# Patient Record
Sex: MALE | Race: BLACK OR AFRICAN AMERICAN | Employment: FULL TIME | URBAN - METROPOLITAN AREA
[De-identification: names, ages, dates, MRNs, and addresses within clinical notes are randomized per-mention and may not be internally consistent; named-entity substitution may affect disease eponyms.]

---

## 2020-08-28 ENCOUNTER — TELEPHONE (OUTPATIENT)
Dept: INTERNAL MEDICINE CLINIC | Age: 30
End: 2020-08-28

## 2020-08-28 NOTE — TELEPHONE ENCOUNTER
----- Message from Medina Eloisa sent at 8/28/2020  2:10 PM EDT -----  Subject: Message to Provider    QUESTIONS  Information for Provider? Patient wants to get shots on his Sept 14th   appointment- flu shot   HPV vaccine (3rd in series)   Hepatitis B vaccine (3rd in series). Patient is also interested in   appointment sooner if there's a cancellation (same day is even ok). Thank   you so much!   ---------------------------------------------------------------------------  --------------  CALL BACK INFO  What is the best way for the office to contact you? OK to leave message on   voicemail  Preferred Call Back Phone Number? [phone]  ---------------------------------------------------------------------------  --------------  SCRIPT ANSWERS  Relationship to Patient?  Self

## 2020-09-14 ENCOUNTER — OFFICE VISIT (OUTPATIENT)
Dept: INTERNAL MEDICINE CLINIC | Age: 30
End: 2020-09-14
Payer: COMMERCIAL

## 2020-09-14 VITALS
HEART RATE: 80 BPM | SYSTOLIC BLOOD PRESSURE: 122 MMHG | BODY MASS INDEX: 21.8 KG/M2 | DIASTOLIC BLOOD PRESSURE: 70 MMHG | TEMPERATURE: 98.1 F | RESPIRATION RATE: 18 BRPM | WEIGHT: 147.6 LBS | OXYGEN SATURATION: 97 %

## 2020-09-14 PROBLEM — Z00.00 ANNUAL PHYSICAL EXAM: Status: ACTIVE | Noted: 2020-09-14

## 2020-09-14 PROBLEM — R05.3 CHRONIC COUGH: Status: ACTIVE | Noted: 2020-09-14

## 2020-09-14 LAB
BILIRUBIN, POC: NORMAL
BLOOD URINE, POC: NORMAL
CLARITY, POC: CLEAR
COLOR, POC: YELLOW
GLUCOSE URINE, POC: NORMAL
KETONES, POC: NORMAL
LEUKOCYTE EST, POC: NORMAL
NITRITE, POC: NORMAL
PH, POC: 5
PROTEIN, POC: NORMAL
SPECIFIC GRAVITY, POC: 1.03
UROBILINOGEN, POC: 0.2

## 2020-09-14 PROCEDURE — 99395 PREV VISIT EST AGE 18-39: CPT | Performed by: INTERNAL MEDICINE

## 2020-09-14 PROCEDURE — 90651 9VHPV VACCINE 2/3 DOSE IM: CPT | Performed by: INTERNAL MEDICINE

## 2020-09-14 PROCEDURE — 90472 IMMUNIZATION ADMIN EACH ADD: CPT | Performed by: INTERNAL MEDICINE

## 2020-09-14 PROCEDURE — 81002 URINALYSIS NONAUTO W/O SCOPE: CPT | Performed by: INTERNAL MEDICINE

## 2020-09-14 PROCEDURE — 90471 IMMUNIZATION ADMIN: CPT | Performed by: INTERNAL MEDICINE

## 2020-09-14 PROCEDURE — 90746 HEPB VACCINE 3 DOSE ADULT IM: CPT | Performed by: INTERNAL MEDICINE

## 2020-09-14 SDOH — ECONOMIC STABILITY: INCOME INSECURITY: HOW HARD IS IT FOR YOU TO PAY FOR THE VERY BASICS LIKE FOOD, HOUSING, MEDICAL CARE, AND HEATING?: NOT HARD AT ALL

## 2020-09-14 SDOH — ECONOMIC STABILITY: FOOD INSECURITY: WITHIN THE PAST 12 MONTHS, THE FOOD YOU BOUGHT JUST DIDN'T LAST AND YOU DIDN'T HAVE MONEY TO GET MORE.: NEVER TRUE

## 2020-09-14 SDOH — ECONOMIC STABILITY: TRANSPORTATION INSECURITY
IN THE PAST 12 MONTHS, HAS THE LACK OF TRANSPORTATION KEPT YOU FROM MEDICAL APPOINTMENTS OR FROM GETTING MEDICATIONS?: NO

## 2020-09-14 SDOH — ECONOMIC STABILITY: TRANSPORTATION INSECURITY
IN THE PAST 12 MONTHS, HAS LACK OF TRANSPORTATION KEPT YOU FROM MEETINGS, WORK, OR FROM GETTING THINGS NEEDED FOR DAILY LIVING?: NO

## 2020-09-14 SDOH — ECONOMIC STABILITY: FOOD INSECURITY: WITHIN THE PAST 12 MONTHS, YOU WORRIED THAT YOUR FOOD WOULD RUN OUT BEFORE YOU GOT MONEY TO BUY MORE.: NEVER TRUE

## 2020-09-14 ASSESSMENT — ENCOUNTER SYMPTOMS
GASTROINTESTINAL NEGATIVE: 1
CHEST TIGHTNESS: 0
SINUS PAIN: 0
WHEEZING: 0
RHINORRHEA: 0
TROUBLE SWALLOWING: 0
COUGH: 1
SINUS PRESSURE: 0
SHORTNESS OF BREATH: 0

## 2020-09-14 ASSESSMENT — PATIENT HEALTH QUESTIONNAIRE - PHQ9
2. FEELING DOWN, DEPRESSED OR HOPELESS: 0
1. LITTLE INTEREST OR PLEASURE IN DOING THINGS: 0
SUM OF ALL RESPONSES TO PHQ QUESTIONS 1-9: 0
SUM OF ALL RESPONSES TO PHQ QUESTIONS 1-9: 0
SUM OF ALL RESPONSES TO PHQ9 QUESTIONS 1 & 2: 0

## 2020-09-14 NOTE — PATIENT INSTRUCTIONS
Discussed issues with him  Will arrange for lung function tests to evaluate his cough   Needs 3 rd HEP-B vaccine  Will give HPV vaccine   Will need fasting labs

## 2020-09-14 NOTE — PROGRESS NOTES
Subjective:      Patient ID: David Gillespie is a 31 y.o. male. annual physical     HPI  He want to have HPV vaccine and 3 rd dose of hep-B  Ha sno health issues or symptoms  Has no symptoms with activity  Has no cp gi or gu symptoms-some times feels winded and some times has light cough-when he does strenuous activity  Has no h/o asthma and has no pets in house. Has sneezes some times and not regularly. Not active physically and walks 30 minutes in pm   Fh ph and personal history reviewed. He is single and not active sexually. Review of Systems   Constitutional: Negative for activity change, appetite change, chills, fatigue, fever and unexpected weight change. HENT: Negative for ear pain, postnasal drip, rhinorrhea, sinus pressure, sinus pain and trouble swallowing. Eyes: Negative for visual disturbance. Respiratory: Positive for cough. Negative for chest tightness, shortness of breath and wheezing. Cardiovascular: Negative for chest pain, palpitations and leg swelling. Gastrointestinal: Negative. Endocrine: Negative. Genitourinary: Negative. Musculoskeletal: Negative. Neurological: Negative for dizziness, light-headedness and headaches. Psychiatric/Behavioral: Negative for dysphoric mood and sleep disturbance. The patient is not nervous/anxious. Objective:   Physical Exam  Vitals signs and nursing note reviewed. Constitutional:       General: He is not in acute distress. Appearance: Normal appearance. HENT:      Head: Normocephalic. Right Ear: Tympanic membrane, ear canal and external ear normal. There is no impacted cerumen. Left Ear: Tympanic membrane, ear canal and external ear normal. There is no impacted cerumen. Nose: Nose normal. No congestion. Mouth/Throat:      Mouth: Mucous membranes are moist.      Pharynx: Oropharynx is clear. No oropharyngeal exudate or posterior oropharyngeal erythema. Eyes:      General: No scleral icterus. Extraocular Movements: Extraocular movements intact. Conjunctiva/sclera: Conjunctivae normal.      Pupils: Pupils are equal, round, and reactive to light. Neck:      Thyroid: No thyromegaly. Vascular: No carotid bruit or JVD. Cardiovascular:      Rate and Rhythm: Normal rate and regular rhythm. Pulses: Normal pulses. Heart sounds: Normal heart sounds. No murmur. No gallop. Pulmonary:      Effort: No respiratory distress. Breath sounds: Normal breath sounds. No wheezing (has forcd exp cough and wheez heard), rhonchi or rales. Abdominal:      General: Bowel sounds are normal. There is no distension. Palpations: Abdomen is soft. There is no hepatomegaly, splenomegaly or mass. Tenderness: There is no abdominal tenderness. Musculoskeletal:      Right lower leg: No edema. Left lower leg: No edema. Lymphadenopathy:      Cervical: No cervical adenopathy. Neurological:      General: No focal deficit present. Mental Status: He is alert.    Psychiatric:         Mood and Affect: Mood normal.         Assessment:    Normal exam  Possibly  has mild asthma        Plan:      Discussed issues with him  Will arrange for lung function tests to evaluate his cough   Needs 3 rd HEP-B vaccine  Will give HPV vaccine   Will need fasting labs        Violet Vazquez MD

## 2020-09-21 ENCOUNTER — OFFICE VISIT (OUTPATIENT)
Dept: PRIMARY CARE CLINIC | Age: 30
End: 2020-09-21
Payer: COMMERCIAL

## 2020-09-21 PROCEDURE — 99211 OFF/OP EST MAY X REQ PHY/QHP: CPT | Performed by: NURSE PRACTITIONER

## 2020-09-22 LAB — SARS-COV-2, NAA: NOT DETECTED

## 2020-09-25 ENCOUNTER — HOSPITAL ENCOUNTER (OUTPATIENT)
Dept: PULMONOLOGY | Age: 30
Discharge: HOME | End: 2020-09-25
Payer: COMMERCIAL

## 2020-09-25 PROCEDURE — 94726 PLETHYSMOGRAPHY LUNG VOLUMES: CPT

## 2020-09-25 PROCEDURE — 94664 DEMO&/EVAL PT USE INHALER: CPT

## 2020-09-25 PROCEDURE — 6360000002 HC RX W HCPCS: Performed by: INTERNAL MEDICINE

## 2020-09-25 PROCEDURE — 94060 EVALUATION OF WHEEZING: CPT

## 2020-09-25 PROCEDURE — 94760 N-INVAS EAR/PLS OXIMETRY 1: CPT

## 2020-09-25 PROCEDURE — 94729 DIFFUSING CAPACITY: CPT

## 2020-09-25 RX ORDER — ALBUTEROL SULFATE 2.5 MG/3ML
2.5 SOLUTION RESPIRATORY (INHALATION) ONCE
Status: COMPLETED | OUTPATIENT
Start: 2020-09-25 | End: 2020-09-25

## 2020-09-25 RX ADMIN — ALBUTEROL SULFATE 2.5 MG: 2.5 SOLUTION RESPIRATORY (INHALATION) at 17:19

## 2020-09-28 DIAGNOSIS — Z00.00 ANNUAL PHYSICAL EXAM: ICD-10-CM

## 2020-09-28 LAB
A/G RATIO: 1.9 (ref 1.1–2.2)
ALBUMIN SERPL-MCNC: 4.6 G/DL (ref 3.4–5)
ALP BLD-CCNC: 80 U/L (ref 40–129)
ALT SERPL-CCNC: 20 U/L (ref 10–40)
ANION GAP SERPL CALCULATED.3IONS-SCNC: 10 MMOL/L (ref 3–16)
AST SERPL-CCNC: 19 U/L (ref 15–37)
BASOPHILS ABSOLUTE: 0 K/UL (ref 0–0.2)
BASOPHILS RELATIVE PERCENT: 0.4 %
BILIRUB SERPL-MCNC: 0.5 MG/DL (ref 0–1)
BUN BLDV-MCNC: 14 MG/DL (ref 7–20)
CALCIUM SERPL-MCNC: 9.9 MG/DL (ref 8.3–10.6)
CHLORIDE BLD-SCNC: 103 MMOL/L (ref 99–110)
CHOLESTEROL, TOTAL: 149 MG/DL (ref 0–199)
CO2: 27 MMOL/L (ref 21–32)
CREAT SERPL-MCNC: 0.9 MG/DL (ref 0.9–1.3)
EOSINOPHILS ABSOLUTE: 0.2 K/UL (ref 0–0.6)
EOSINOPHILS RELATIVE PERCENT: 4.5 %
GFR AFRICAN AMERICAN: >60
GFR NON-AFRICAN AMERICAN: >60
GLOBULIN: 2.4 G/DL
GLUCOSE BLD-MCNC: 88 MG/DL (ref 70–99)
HCT VFR BLD CALC: 47 % (ref 40.5–52.5)
HDLC SERPL-MCNC: 54 MG/DL (ref 40–60)
HEMOGLOBIN: 15.7 G/DL (ref 13.5–17.5)
LDL CHOLESTEROL CALCULATED: 83 MG/DL
LYMPHOCYTES ABSOLUTE: 2.1 K/UL (ref 1–5.1)
LYMPHOCYTES RELATIVE PERCENT: 40.9 %
MCH RBC QN AUTO: 29.8 PG (ref 26–34)
MCHC RBC AUTO-ENTMCNC: 33.5 G/DL (ref 31–36)
MCV RBC AUTO: 88.8 FL (ref 80–100)
MONOCYTES ABSOLUTE: 0.4 K/UL (ref 0–1.3)
MONOCYTES RELATIVE PERCENT: 7.2 %
NEUTROPHILS ABSOLUTE: 2.4 K/UL (ref 1.7–7.7)
NEUTROPHILS RELATIVE PERCENT: 47 %
PDW BLD-RTO: 12.7 % (ref 12.4–15.4)
PLATELET # BLD: 243 K/UL (ref 135–450)
PMV BLD AUTO: 8.4 FL (ref 5–10.5)
POTASSIUM SERPL-SCNC: 4.9 MMOL/L (ref 3.5–5.1)
RBC # BLD: 5.29 M/UL (ref 4.2–5.9)
SODIUM BLD-SCNC: 140 MMOL/L (ref 136–145)
TOTAL PROTEIN: 7 G/DL (ref 6.4–8.2)
TRIGL SERPL-MCNC: 60 MG/DL (ref 0–150)
TSH SERPL DL<=0.05 MIU/L-ACNC: 2.54 UIU/ML (ref 0.27–4.2)
VLDLC SERPL CALC-MCNC: 12 MG/DL
WBC # BLD: 5 K/UL (ref 4–11)

## 2020-10-01 LAB — IGE: 128 KU/L

## 2020-10-06 RX ORDER — FLUTICASONE PROPIONATE 110 UG/1
2 AEROSOL, METERED RESPIRATORY (INHALATION) 2 TIMES DAILY
Qty: 1 INHALER | Refills: 1 | Status: SHIPPED | OUTPATIENT
Start: 2020-10-06 | End: 2021-06-18

## 2020-10-06 NOTE — PROCEDURES
Pulmonary Function Test:     Indication: Annual physical exam    Never smoked     Test comment:     Spirometry data is acceptable and reproducible. Maximum effort given during the test.    Pulse ox is 99% on room air    Estimated body mass index is 21.8 kg/m² as calculated from the following:    Height as of 12/27/14: 5' 9\" (1.753 m). Weight as of 9/14/20: 147 lb 9.6 oz (67 kg). Spirometry data:    FEV1/FVC: 84. Predicted ratio 83    Pre-Bronchodilator FEV1 4.02L, which is 105% predicted    Post-Bronchodilator FEV1 4.3L, which is 113% predicted    There is 7% reversibility     FVC is 4.81L, which is 105% predicted    Lung Volumes:    TLC (by Plethysmography) is 6.16L, which is 89% predicted    RV is 2. 11L which is 128% predicted    Diffusion Capacity:    DLCO is 32.31 which is 79% predicted    Impression:  1. There is no obstruction present  2. There is no significant reversibility with bronchodilator      [Increase in FEV1 => 12% of control and => 200 ml]  However there is improvement in flow across small airways with bronchodilator   3. There is no significant hyperinflation. There is air trapping of unclear significance   4. There is no reduction in diffusion capacity    Comment:   Normal spirometry and diffusion capacity   Presence of air trapping is of unclear significance. Clinical correlation is recommended.

## 2020-10-09 ENCOUNTER — OFFICE VISIT (OUTPATIENT)
Dept: INTERNAL MEDICINE CLINIC | Age: 30
End: 2020-10-09
Payer: COMMERCIAL

## 2020-10-09 VITALS
TEMPERATURE: 98.2 F | OXYGEN SATURATION: 99 % | SYSTOLIC BLOOD PRESSURE: 112 MMHG | RESPIRATION RATE: 16 BRPM | HEIGHT: 69 IN | WEIGHT: 152.2 LBS | BODY MASS INDEX: 22.54 KG/M2 | HEART RATE: 80 BPM | DIASTOLIC BLOOD PRESSURE: 75 MMHG

## 2020-10-09 PROCEDURE — 90471 IMMUNIZATION ADMIN: CPT | Performed by: INTERNAL MEDICINE

## 2020-10-09 PROCEDURE — 99213 OFFICE O/P EST LOW 20 MIN: CPT | Performed by: INTERNAL MEDICINE

## 2020-10-09 PROCEDURE — 90686 IIV4 VACC NO PRSV 0.5 ML IM: CPT | Performed by: INTERNAL MEDICINE

## 2020-10-09 ASSESSMENT — ENCOUNTER SYMPTOMS
SORE THROAT: 0
TROUBLE SWALLOWING: 0
RHINORRHEA: 0

## 2020-10-09 NOTE — PROGRESS NOTES
Vaccine Information Sheet, \"Influenza - Inactivated\"  given to [employer], or parent/legal guardian of  [employer] and verbalized understanding. Patient responses:    Have you ever had a reaction to a flu vaccine? No  Do you have any current illness? No  Have you ever had Guillian [place] Syndrome? No  Do you have a serious allergy to any of the follow: Neomycin, Polymyxin, Thimerosal, eggs or egg products? No    Flu vaccine given per order. Please see immunization tab. Risks and benefits explained. Current VIS given.       Immunizations Administered     Name Date Dose Route    Influenza, Quadv, IM, PF (6 mo and older Fluzone, Flulaval, Fluarix, and 3 yrs and older Afluria) 10/9/2020 0.5 mL Intramuscular    Site: Deltoid- Left    Lot: [id number]    NDC: 70812-019-55

## 2020-10-09 NOTE — PROGRESS NOTES
Subjective:      Patient ID: Dakotah Segura is a 31 y.o. male. HPI  Still has cough on and off   And when he coughs he feels some movement in chest and is not sure what it is. Has some  wheezing after cough  And has no dyspnea with exertion or activities   Has not sure he has symptoms after taking or laughing. He ha sno heart burns  Review of Systems   Constitutional: Negative for fever. HENT: Negative for postnasal drip, rhinorrhea, sore throat and trouble swallowing. Neurological: Negative for dizziness, light-headedness and headaches. Objective:   Physical Exam  Vitals signs and nursing note reviewed. Constitutional:       General: He is not in acute distress. Eyes:      General: No scleral icterus. Extraocular Movements: Extraocular movements intact. Conjunctiva/sclera: Conjunctivae normal.      Pupils: Pupils are equal, round, and reactive to light. Neck:      Thyroid: No thyromegaly. Vascular: No carotid bruit. Cardiovascular:      Rate and Rhythm: Normal rate and regular rhythm. Pulses: Normal pulses. Heart sounds: Normal heart sounds. No murmur. No gallop. Pulmonary:      Effort: No respiratory distress. Breath sounds: Normal breath sounds. No wheezing (has cough after deep breathing), rhonchi or rales. Lymphadenopathy:      Cervical: No cervical adenopathy. Neurological:      Mental Status: He is alert. PFt indicating improvements in FEV1 after bronchodilators.   Assessment:      Cough possibly due to bronchospasm -by symptoms and PFT reports-may have [facility]:      [address] [place] [address] [address] START USING INHALER AND [address] IN 3 Adrian Fuentes MD

## 2020-10-09 NOTE — PATIENT INSTRUCTIONS
DISCUSSED WITH HIM IN DETAIL AND START USING INHALER AND SE E IN 3 WEEKS  Patient Education        Using a Metered-Dose Inhaler: Care Instructions  Your Care Instructions     A metered-dose inhaler lets you breathe medicine into your lungs quickly. Inhaled medicine works faster than the same medicine in a pill. An inhaler allows you to take less medicine than you would need if you took it as a pill. \"Metered-dose\" means that the inhaler gives a measured amount of medicine each time you use it. A metered-dose inhaler gives medicine in the form of a liquid mist.  Your doctor may want you to use a spacer with your inhaler. A spacer is a chamber that you attach to the inhaler. The chamber holds the medicine before you inhale it. That way, you can inhale the medicine in as many breaths as you need. Doctors recommend using a spacer with most metered-dose inhalers, especially those with corticosteroid medicines. Follow-up care is a key part of your treatment and safety. Be sure to make and go to all appointments, and call your doctor if you are having problems. It's also a good idea to know your test results and keep a list of the medicines you take. How can you care for yourself at home? To get started  · Talk with your health care provider to be sure you are using your inhaler the right way. It might help if you practice using it in front of a mirror. Use the inhaler exactly as prescribed. · Check that you have the correct medicine. If you use more than one inhaler, put a label on each one. This will let you know which one to use at the right time. · Keep track of how much medicine is in the inhaler. Check the label to see how many doses are in the container. If you know how many puffs you can take, you can replace the inhaler before you run out. Ask your health care provider how you can keep track of how much medicine is left. · Talk to your health care provider about using a spacer with your inhaler.  Spacers make it easier to get the medicine into your lungs. You may need a spacer if you are using corticosteroid medicines. A spacer can also help if you have problems pressing the inhaler and breathing in at the same time. · If you are using a corticosteroid inhaler, gargle and rinse out your mouth with water after use. Do not swallow the water. Swallowing the water will increase the chance that the medicine will get into your bloodstream. This may make it more likely that you will have side effects. To use a spacer with an inhaler  1. Shake the inhaler. Remove the inhaler cap, and place the mouthpiece of the inhaler into the spacer. Check the inhaler instructions to see if you need to prime your inhaler before you use it. If it needs priming, follow the instructions on how to prime your inhaler. 2. Remove the cap from the spacer. 3. Hold the inhaler upright with the mouthpiece at the bottom. 4. Tilt your head back a little, and breathe out slowly and completely. 5. Place the spacer's mouthpiece in your mouth. 6. Press down on the inhaler to spray one puff of medicine into the spacer, and then start breathing in slowly. Wait to inhale until after you have pressed down on the inhaler. Some spacers have a whistle. If you hear it, you should breathe in more slowly. 7. Hold your breath for 10 seconds. This will let the medicine settle in your lungs. 8. If you need to take a second dose, wait 30 to 60 seconds to allow the inhaler valve to refill. To use an inhaler without a spacer  1. Shake the inhaler as directed. Remove the cap. Check the instructions to see if you need to prime your inhaler before you use it. If it needs priming, follow the instructions on how to prime your inhaler. 2. Hold the inhaler upright with the mouthpiece at the bottom. 3. Tilt your head back a little, and breathe out slowly and completely. 4. Position the inhaler in one of two ways:  ? You can place the inhaler in your mouth.  This is easier for most people. And it lowers the risk that any of the medicine will get into your eyes. ? Or you can place the inhaler 1 to 2 inches in front of your open mouth, without closing your lips over it. Try to open your mouth as wide as you can. Placing the inhaler in front of your open mouth may be better for getting the medicine into your lungs. But some people may find this too hard to do. 5. Start taking slow, even breaths through your mouth. Press down on the inhaler once, then inhale fully. 6. Hold your breath for 10 seconds. This will let the medicine settle in your lungs. 7. If you need to take a second dose, wait 30 to 60 seconds to allow the inhaler valve to refill. Where can you learn more? Go to https://relocality.[employer]. org and sign in to your [employer] account. Enter K111 in the [employer] box to learn more about \"Using a Metered-Dose Inhaler: Care Instructions. \"     If you do not have an account, please click on the \"Sign Up Now\" link. Current as of: February 24, 2020               Content Version: 12.6  © 8618-5023 [employer]. Care instructions adapted under license by [facility] ([facility]). If you have questions about a medical condition or this instruction, always ask your healthcare professional. [address] any warranty or liability for your use of this information.

## 2020-10-14 PROBLEM — Z00.00 ANNUAL PHYSICAL EXAM: Status: RESOLVED | Noted: 2020-09-14 | Resolved: 2020-10-14

## 2020-11-02 ENCOUNTER — OFFICE VISIT (OUTPATIENT)
Dept: INTERNAL MEDICINE CLINIC | Age: 30
End: 2020-11-02
Payer: COMMERCIAL

## 2020-11-02 VITALS
HEART RATE: 96 BPM | OXYGEN SATURATION: 99 % | SYSTOLIC BLOOD PRESSURE: 122 MMHG | RESPIRATION RATE: 18 BRPM | WEIGHT: 150.6 LBS | BODY MASS INDEX: 22.24 KG/M2 | TEMPERATURE: 98.5 F | DIASTOLIC BLOOD PRESSURE: 80 MMHG

## 2020-11-02 PROCEDURE — 99213 OFFICE O/P EST LOW 20 MIN: CPT | Performed by: INTERNAL MEDICINE

## 2020-11-02 ASSESSMENT — ENCOUNTER SYMPTOMS
TROUBLE SWALLOWING: 0
WHEEZING: 0
SHORTNESS OF BREATH: 0
CHEST TIGHTNESS: 0
COUGH: 0

## 2020-11-02 NOTE — PROGRESS NOTES
Subjective:      Patient ID: Corinne Valentino is a 31 y.o. male. HPI  Cough is lot better and much better  Now cough is occasional and sleeping well now  Physically  has no symptoms with activity  Has no other cp gi or gu symptoms  Review of Systems   Constitutional: Negative for activity change, appetite change, chills, fever and unexpected weight change. HENT: Negative for trouble swallowing. Respiratory: Negative for cough (may be occasionally.), chest tightness, shortness of breath and wheezing. Cardiovascular: Negative for chest pain, palpitations and leg swelling. Neurological: Negative for dizziness, light-headedness and headaches. Objective:   Physical Exam  Vitals signs and nursing note reviewed. Constitutional:       General: He is not in acute distress. Eyes:      Extraocular Movements: Extraocular movements intact. Conjunctiva/sclera: Conjunctivae normal.      Pupils: Pupils are equal, round, and reactive to light. Neck:      Vascular: No carotid bruit. Cardiovascular:      Rate and Rhythm: Normal rate and regular rhythm. Pulses: Normal pulses. Heart sounds: Normal heart sounds. No murmur. No gallop. Pulmonary:      Effort: No respiratory distress. Breath sounds: Normal breath sounds. No wheezing, rhonchi or rales. Musculoskeletal:      Right lower leg: No edema. Left lower leg: No edema. Lymphadenopathy:      Cervical: No cervical adenopathy. Neurological:      Mental Status: He is alert. Assessment:      Bronchospasm  symptoms much improved but forgetting pm dose x 1 week    symptoms started this summer   Plan:      Advised to use Flovent inhaler 2 puffs 2 times daily. see in  2 months.         Arias Harris MD

## 2021-01-04 ENCOUNTER — OFFICE VISIT (OUTPATIENT)
Dept: INTERNAL MEDICINE CLINIC | Age: 31
End: 2021-01-04
Payer: COMMERCIAL

## 2021-01-04 VITALS
TEMPERATURE: 97.9 F | HEIGHT: 69 IN | RESPIRATION RATE: 12 BRPM | WEIGHT: 150 LBS | SYSTOLIC BLOOD PRESSURE: 124 MMHG | OXYGEN SATURATION: 98 % | BODY MASS INDEX: 22.22 KG/M2 | DIASTOLIC BLOOD PRESSURE: 84 MMHG | HEART RATE: 84 BPM

## 2021-01-04 DIAGNOSIS — J45.30 MILD PERSISTENT REACTIVE AIRWAY DISEASE WITHOUT COMPLICATION: ICD-10-CM

## 2021-01-04 DIAGNOSIS — R05.3 CHRONIC COUGH: Primary | ICD-10-CM

## 2021-01-04 PROBLEM — J45.909 REACTIVE AIRWAY DISEASE WITHOUT COMPLICATION: Status: ACTIVE | Noted: 2021-01-04

## 2021-01-04 PROCEDURE — 99213 OFFICE O/P EST LOW 20 MIN: CPT | Performed by: INTERNAL MEDICINE

## 2021-01-04 ASSESSMENT — ENCOUNTER SYMPTOMS
CHEST TIGHTNESS: 0
GASTROINTESTINAL NEGATIVE: 1
WHEEZING: 0
SHORTNESS OF BREATH: 1

## 2021-01-04 ASSESSMENT — PATIENT HEALTH QUESTIONNAIRE - PHQ9
2. FEELING DOWN, DEPRESSED OR HOPELESS: 0
SUM OF ALL RESPONSES TO PHQ QUESTIONS 1-9: 0
SUM OF ALL RESPONSES TO PHQ9 QUESTIONS 1 & 2: 0
1. LITTLE INTEREST OR PLEASURE IN DOING THINGS: 0

## 2021-03-11 PROBLEM — M41.9 SCOLIOSIS OF THORACIC SPINE: Status: ACTIVE | Noted: 2018-05-15

## 2021-03-11 PROBLEM — K12.1 ORAL ULCER: Status: ACTIVE | Noted: 2018-05-15

## 2021-03-12 ENCOUNTER — OFFICE VISIT (OUTPATIENT)
Dept: PULMONOLOGY | Age: 31
End: 2021-03-12
Payer: COMMERCIAL

## 2021-03-12 VITALS
HEART RATE: 78 BPM | DIASTOLIC BLOOD PRESSURE: 64 MMHG | BODY MASS INDEX: 22.22 KG/M2 | WEIGHT: 150 LBS | SYSTOLIC BLOOD PRESSURE: 118 MMHG | OXYGEN SATURATION: 99 % | HEIGHT: 69 IN

## 2021-03-12 DIAGNOSIS — J45.30 MILD PERSISTENT REACTIVE AIRWAY DISEASE WITHOUT COMPLICATION: ICD-10-CM

## 2021-03-12 DIAGNOSIS — R05.3 CHRONIC COUGH: Primary | ICD-10-CM

## 2021-03-12 PROCEDURE — 94664 DEMO&/EVAL PT USE INHALER: CPT | Performed by: INTERNAL MEDICINE

## 2021-03-12 PROCEDURE — 99204 OFFICE O/P NEW MOD 45 MIN: CPT | Performed by: INTERNAL MEDICINE

## 2021-03-12 NOTE — PROGRESS NOTES
PULMONARY OFFICE NEW PATIENT VISIT    CONSULTING PHYSICIAN:  Montse Walls MD ,     REASON FOR VISIT:   Chief Complaint   Patient presents with    New Patient    Shortness of Breath       DATE OF VISIT: 3/12/2021    HISTORY OF PRESENT ILLNESS: 31y.o. year old male into the pulmonary office for the first time. Patient reports that in the summer 2020 he was exposed to some dust and smoke which led to shortness of breath, wheezing and chest tightness. After a few days symptoms subsided but then he started to have episodic cough and shortness of breath. He was prescribed with Flovent HFA by his primary care physician which has provided him with partial relief. He still continues to have exertional shortness of breath associated with wheezing. Cough is very intermittent and dry. No expectoration. Denies any nasal symptoms. No family history of lung diseases. Patient did not have any breathing issues in his childhood. REVIEW OF SYSTEMS:   CONSTITUTIONAL SYMPTOMS: The patient denies fever, fatigue, night sweats, weight loss or weight gain. HEENT: No vision changes. No tinnitus, Denies sinus pain. No hoarseness, or dysphagia. NECK: Patient denies swelling in the neck. CARDIOVASCULAR: Denies chest pain, palpitation, syncope. RESPIRATORY: As per HPI. GASTROINTESTINAL: Denies nausea, abdominal pain or change in bowel function. GENITOURINARY: Denies obstructive symptoms. No history of incontinence. BREASTS: No masses or lumps in the breasts. SKIN: No rashes or itching. MUSCULOSKELETAL: Denies weakness or bone pain. NEUROLOGICAL: No headaches or seizures. PSYCHIATRIC: Denies mood swings or depression. ENDOCRINE: Denies heat or cold intolerance or excessive thirst.  HEMATOLOGIC/LYMPHATIC: Denies easy bruising or lymph node swelling. ALLERGIC/IMMUNOLOGIC: No environmental allergies. PAST MEDICAL HISTORY: History reviewed. No pertinent past medical history.     PAST SURGICAL patient has a normal sensorium globally. LABS:    Lab Summary Latest Ref Rng & Units 9/28/2020   WBC 4.0 - 11.0 K/uL 5.0   Hgb 13.5 - 17.5 g/dL 15.7   Hct 40.5 - 52.5 % 47.0   Platelets 388 - 241 K/uL 243   Sodium 136 - 145 mmol/L 140   Potassium 3.5 - 5.1 mmol/L 4.9   BUN 7 - 20 mg/dL 14   Creatinine 0.9 - 1.3 mg/dL 0.9   Glucose 70 - 99 mg/dL 88   Calcium 8.3 - 10.6 mg/dL 9.9   Alk Phos 40 - 129 U/L 80   Albumin 3.4 - 5.0 g/dL 4.6   Bilirubin 0.0 - 1.0 mg/dL 0.5   AST 15 - 37 U/L 19   ALT 10 - 40 U/L 20   HDL cholesterol 40 - 60 mg/dL 54   Triglycerides 0 - 150 mg/dL 60   LDL calc <100 mg/dL 83   VLDL calc Not Established mg/dL 12   TSH 0.27 - 4.20 uIU/mL 2.54         IMAGING:    No new chest imaging for me to review. Pulmonary Functions Testing Results:    Complete PFT done on 9/24/2020  FEV1 prebronchodilator 4.02/105 post 4.30/13.  7% change  FVC prebronchodilator 4.81/105 post 4.92/8.  2% change  FEV1/FVC prebronchodilator 84 post 87  Total lung capacity: 89%  Residual volume: 128%  Diffusion capacity: 79%    This complete PFT was personally viewed by me and my interpretation is: Spirometry is within normal limits. Evidence of air trapping. Near normal diffusion capacity. IMPRESSION AND RECOMMENDATIONS:     1. Chronic cough  -Patient has intermittent cough which is dry and nonproductive.  -Strong suspicion that patient may have mild intermittent asthma. 2. Mild persistent reactive airway disease without complication  -I will change his inhaler therapy to Advair HFA in order to provide him with both inhaled corticosteroids and long-acting beta-2 agonist.  -Patient was instructed in the use of MDI Demonstration Device. Patient understood instructions and is aware to call the office if they have any problems or questions.   Time spent was 5 mins.  -Based on his response I will tailor the therapy further.  -Advised patient to avoid any potential triggers that he can identify.  -Additionally I will order for a total IgE level and respiratory allergen panel. Return in about 3 months (around 6/12/2021). Isatu Jameson MD  Pulmonary Critical Care and Sleep Medicine  3/12/2021, 4:31 PM    This note was completed using dragon medical speech recognition software. Grammatical errors, random word insertions, pronoun errors and incomplete sentences are occasional consequences of this technology due to software limitations. If there are questions or concerns about the content of this note of information contained within the body of this dictation they should be addressed with the provider for clarification.

## 2021-05-04 ENCOUNTER — TELEPHONE (OUTPATIENT)
Dept: EMERGENCY DEPT | Age: 31
End: 2021-05-04

## 2021-05-04 NOTE — TELEPHONE ENCOUNTER
[employer] member made an outreach attempt in regards to scheduling a COVID vaccine appointment, there was no answer but writer left a message.

## 2021-06-18 ENCOUNTER — OFFICE VISIT (OUTPATIENT)
Dept: PULMONOLOGY | Age: 31
End: 2021-06-18
Payer: COMMERCIAL

## 2021-06-18 VITALS
HEIGHT: 69 IN | HEART RATE: 91 BPM | OXYGEN SATURATION: 99 % | SYSTOLIC BLOOD PRESSURE: 112 MMHG | DIASTOLIC BLOOD PRESSURE: 70 MMHG | BODY MASS INDEX: 22.22 KG/M2 | WEIGHT: 150 LBS

## 2021-06-18 DIAGNOSIS — R05.3 CHRONIC COUGH: Primary | ICD-10-CM

## 2021-06-18 DIAGNOSIS — J45.30 MILD PERSISTENT REACTIVE AIRWAY DISEASE WITHOUT COMPLICATION: ICD-10-CM

## 2021-06-18 PROCEDURE — 94664 DEMO&/EVAL PT USE INHALER: CPT | Performed by: INTERNAL MEDICINE

## 2021-06-18 PROCEDURE — 99214 OFFICE O/P EST MOD 30 MIN: CPT | Performed by: INTERNAL MEDICINE

## 2021-06-18 RX ORDER — ALBUTEROL SULFATE 90 UG/1
2 AEROSOL, METERED RESPIRATORY (INHALATION) EVERY 6 HOURS PRN
Qty: 1 INHALER | Refills: 5 | Status: SHIPPED | OUTPATIENT
Start: 2021-06-18 | End: 2021-12-21 | Stop reason: SDUPTHER

## 2021-06-18 NOTE — PROGRESS NOTES
easy bruising or lymph node swelling. ALLERGIC/IMMUNOLOGIC: No environmental allergies. PAST MEDICAL HISTORY: History reviewed. No pertinent past medical history. PAST SURGICAL HISTORY: History reviewed. No pertinent surgical history. SOCIAL HISTORY:   Social History     Tobacco Use    Smoking status: Never Smoker    Smokeless tobacco: Never Used   Vaping Use    Vaping Use: Never used   Substance Use Topics    Alcohol use: No    Drug use: Not on file       FAMILY HISTORY:   Family History   Problem Relation Age of Onset    Diabetes Father        MEDICATIONS:     No current outpatient medications on file prior to visit. No current facility-administered medications on file prior to visit. ALLERGIES:   Allergies as of 06/18/2021    (No Known Allergies)      OBJECTIVE:   height is 5' 9\" (1.753 m) and weight is 150 lb (68 kg). His blood pressure is 112/70 and his pulse is 91. His oxygen saturation is 99%. PHYSICAL EXAM:    CONSTITUTIONAL: He is a 27y.o.-year-old who appears his stated age. He is alert and oriented x 3 and in no acute distress. HEENT: PERRLA, EOMI. No scleral icterus. No thrush, atraumatic, normocephalic. NECK: Supple, without cervical or supraclavicular lymphadenopathy:  CARDIOVASCULAR: S1 S2 RRR. Without murmer  RESPIRATORY & CHEST: Lungs are clear to auscultation and percussion. No wheezing, no crackles. Good air movement  GASTROINTESTINAL & ABDOMEN: Soft, nontender, positive bowel sounds in all quadrants, non-distended, without hepatosplenomegaly. GENITOURINARY: Deferred. MUSCULOSKELETAL: No tenderness to palpation of the axial skeleton. There is no clubbing. No cyanosis. No edema of the lower extremities. SKIN OF BODY: No rash or jaundice. PSYCHIATRIC EVALUATION: Normal affect. Patient answers questions appropriately. HEMATOLOGIC/LYMPHATIC/ IMMUNOLOGIC: No palpable lymphadenopathy. NEUROLOGIC: Alert and oriented x 3. Groslly non-focal. Motor strength is 5+/5 in all muscle groups. The patient has a normal sensorium globally. LABS:    Lab Summary Latest Ref Rng & Units 9/28/2020   WBC 4.0 - 11.0 K/uL 5.0   Hgb 13.5 - 17.5 g/dL 15.7   Hct 40.5 - 52.5 % 47.0   Platelets 805 - 574 K/uL 243   Sodium 136 - 145 mmol/L 140   Potassium 3.5 - 5.1 mmol/L 4.9   BUN 7 - 20 mg/dL 14   Creatinine 0.9 - 1.3 mg/dL 0.9   Glucose 70 - 99 mg/dL 88   Calcium 8.3 - 10.6 mg/dL 9.9   Alk Phos 40 - 129 U/L 80   Albumin 3.4 - 5.0 g/dL 4.6   Bilirubin 0.0 - 1.0 mg/dL 0.5   AST 15 - 37 U/L 19   ALT 10 - 40 U/L 20   HDL cholesterol 40 - 60 mg/dL 54   Triglycerides 0 - 150 mg/dL 60   LDL calc <100 mg/dL 83   VLDL calc Not Established mg/dL 12   TSH 0.27 - 4.20 uIU/mL 2.54         IMAGING:    No new chest imaging for me to review. Pulmonary Functions Testing Results:    Complete PFT done on 9/24/2020  FEV1 prebronchodilator 4.02/105 post 4.30/13.  7% change  FVC prebronchodilator 4.81/105 post 4.92/8.  2% change  FEV1/FVC prebronchodilator 84 post 87  Total lung capacity: 89%  Residual volume: 128%  Diffusion capacity: 79%    This complete PFT was personally viewed by me and my interpretation is: Spirometry is within normal limits. Evidence of air trapping. Near normal diffusion capacity. IMPRESSION AND RECOMMENDATIONS:     1. Chronic cough  -Patient has intermittent cough which is dry and nonproductive.  -Strong suspicion that patient may have mild intermittent asthma. 2. Mild persistent reactive airway disease without complication  -I will continue him with Advair HFA as before.    -In addition I will add albuterol HFA provided with as needed short acting bronchodilators.    -Patient was instructed in the use of MDI Demonstration Device. Patient understood instructions and is aware to call the office if they have any problems or questions. Time spent was 5 mins.   -Advised patient to avoid any potential triggers that he can identify.  -Additionally I will order for a total IgE level and respiratory allergen panel. Return in about 6 months (around 12/18/2021). Kori Jackson MD  Pulmonary Critical Care and Sleep Medicine  6/18/2021, 11:05 AM    This note was completed using dragon medical speech recognition software. Grammatical errors, random word insertions, pronoun errors and incomplete sentences are occasional consequences of this technology due to software limitations. If there are questions or concerns about the content of this note of information contained within the body of this dictation they should be addressed with the provider for clarification.

## 2021-12-15 ENCOUNTER — TELEPHONE (OUTPATIENT)
Dept: INTERNAL MEDICINE CLINIC | Age: 31
End: 2021-12-15
Payer: COMMERCIAL

## 2021-12-15 DIAGNOSIS — Z00.00 ANNUAL PHYSICAL EXAM: Primary | ICD-10-CM

## 2021-12-15 NOTE — TELEPHONE ENCOUNTER
----- Message from Na Stone sent at 12/15/2021  3:10 PM EST -----  Subject: Referral Request    QUESTIONS   Reason for referral request? pt has appt on 12/22 and he would like to get   his lab done prior to appt so he can discuss the results during his appt   time. please call pt once orders are placed so he can get his labs done. call pt before placing labs so he knows what he is getting done. Has the physician seen you for this condition before? No   Preferred Specialist (if applicable)? Do you already have an appointment scheduled? No  Additional Information for Provider?   ---------------------------------------------------------------------------  --------------  CALL BACK INFO  What is the best way for the office to contact you? OK to leave message on   voicemail  Preferred Call Back Phone Number?  [phone]

## 2021-12-16 NOTE — TELEPHONE ENCOUNTER
You can,but they will not be part of annual exam  Can order them  for  dx of h/o exposure to STD,if he has h/o this  and let patient know of this before ordering and if he understands this,can order tests  If there is no h/o std exposure ,current recommendations are for only hep-c and HIV as part of annual exam

## 2021-12-16 NOTE — TELEPHONE ENCOUNTER
I informed patient that we could do labs, I will place orders, however while speaking to patient , he would like to have STI/ STD testing HIV, Hep C, Hep B and chlamydia/ gonorrhea- Okay to place these orders?

## 2021-12-17 ENCOUNTER — NURSE ONLY (OUTPATIENT)
Dept: INTERNAL MEDICINE CLINIC | Age: 31
End: 2021-12-17
Payer: COMMERCIAL

## 2021-12-17 DIAGNOSIS — Z00.00 ANNUAL PHYSICAL EXAM: Primary | ICD-10-CM

## 2021-12-17 DIAGNOSIS — Z20.2 POTENTIAL EXPOSURE TO STD: ICD-10-CM

## 2021-12-17 LAB
A/G RATIO: 1.8 (ref 1.1–2.2)
ALBUMIN SERPL-MCNC: 4.8 G/DL (ref 3.4–5)
ALP BLD-CCNC: 79 U/L (ref 40–129)
ALT SERPL-CCNC: 28 U/L (ref 10–40)
ANION GAP SERPL CALCULATED.3IONS-SCNC: 13 MMOL/L (ref 3–16)
AST SERPL-CCNC: 37 U/L (ref 15–37)
BASOPHILS ABSOLUTE: 0 K/UL (ref 0–0.2)
BASOPHILS RELATIVE PERCENT: 0.2 %
BILIRUB SERPL-MCNC: 0.3 MG/DL (ref 0–1)
BILIRUBIN URINE: NEGATIVE
BLOOD, URINE: NEGATIVE
BUN BLDV-MCNC: 16 MG/DL (ref 7–20)
CALCIUM SERPL-MCNC: 9.2 MG/DL (ref 8.3–10.6)
CHLORIDE BLD-SCNC: 105 MMOL/L (ref 99–110)
CHOLESTEROL, TOTAL: 138 MG/DL (ref 0–199)
CLARITY: CLEAR
CO2: 23 MMOL/L (ref 21–32)
COLOR: YELLOW
CREAT SERPL-MCNC: 0.8 MG/DL (ref 0.9–1.3)
EOSINOPHILS ABSOLUTE: 0.1 K/UL (ref 0–0.6)
EOSINOPHILS RELATIVE PERCENT: 0.7 %
GFR AFRICAN AMERICAN: >60
GFR NON-AFRICAN AMERICAN: >60
GLUCOSE BLD-MCNC: 98 MG/DL (ref 70–99)
GLUCOSE URINE: NEGATIVE MG/DL
HBV SURFACE AB TITR SER: >1000 MIU/ML
HCT VFR BLD CALC: 47.2 % (ref 40.5–52.5)
HDLC SERPL-MCNC: 51 MG/DL (ref 40–60)
HEMOGLOBIN: 15.5 G/DL (ref 13.5–17.5)
HEPATITIS C ANTIBODY INTERPRETATION: NORMAL
KETONES, URINE: NEGATIVE MG/DL
LDL CHOLESTEROL CALCULATED: 77 MG/DL
LEUKOCYTE ESTERASE, URINE: NEGATIVE
LYMPHOCYTES ABSOLUTE: 1.3 K/UL (ref 1–5.1)
LYMPHOCYTES RELATIVE PERCENT: 16.9 %
MCH RBC QN AUTO: 29.5 PG (ref 26–34)
MCHC RBC AUTO-ENTMCNC: 32.9 G/DL (ref 31–36)
MCV RBC AUTO: 89.8 FL (ref 80–100)
MICROSCOPIC EXAMINATION: NORMAL
MONOCYTES ABSOLUTE: 0.4 K/UL (ref 0–1.3)
MONOCYTES RELATIVE PERCENT: 5 %
NEUTROPHILS ABSOLUTE: 5.9 K/UL (ref 1.7–7.7)
NEUTROPHILS RELATIVE PERCENT: 77.2 %
NITRITE, URINE: NEGATIVE
PDW BLD-RTO: 12.4 % (ref 12.4–15.4)
PH UA: 5.5 (ref 5–8)
PLATELET # BLD: 222 K/UL (ref 135–450)
PMV BLD AUTO: 9.3 FL (ref 5–10.5)
POTASSIUM SERPL-SCNC: 5.6 MMOL/L (ref 3.5–5.1)
PROTEIN UA: NEGATIVE MG/DL
RBC # BLD: 5.26 M/UL (ref 4.2–5.9)
SODIUM BLD-SCNC: 141 MMOL/L (ref 136–145)
SPECIFIC GRAVITY UA: 1.03 (ref 1–1.03)
TOTAL PROTEIN: 7.4 G/DL (ref 6.4–8.2)
TRIGL SERPL-MCNC: 50 MG/DL (ref 0–150)
URINE TYPE: NORMAL
UROBILINOGEN, URINE: 0.2 E.U./DL
VLDLC SERPL CALC-MCNC: 10 MG/DL
WBC # BLD: 7.6 K/UL (ref 4–11)

## 2021-12-17 PROCEDURE — 81003 URINALYSIS AUTO W/O SCOPE: CPT | Performed by: INTERNAL MEDICINE

## 2021-12-17 PROCEDURE — 36415 COLL VENOUS BLD VENIPUNCTURE: CPT | Performed by: INTERNAL MEDICINE

## 2021-12-17 NOTE — PROGRESS NOTES
All labs were drawn for annual physical: CBC, CMP, LIPIDS, UA. Per patient's request , he wanted to have STD testing done. I explained to patient that this is not going to be covered under his annual wellness exam, that it will he will  be responsible for the bill, unless his insurance pays a portion of it. He states that he understands this.

## 2021-12-18 LAB
HIV AG/AB: NORMAL
HIV ANTIGEN: NORMAL
HIV-1 ANTIBODY: NORMAL
HIV-2 AB: NORMAL
TOTAL SYPHILLIS IGG/IGM: NORMAL

## 2021-12-19 LAB
C. TRACHOMATIS DNA ,URINE: NEGATIVE
N. GONORRHOEAE DNA, URINE: NEGATIVE

## 2021-12-21 DIAGNOSIS — J45.30 MILD PERSISTENT REACTIVE AIRWAY DISEASE WITHOUT COMPLICATION: Primary | ICD-10-CM

## 2021-12-21 RX ORDER — ALBUTEROL SULFATE 90 UG/1
2 AEROSOL, METERED RESPIRATORY (INHALATION) EVERY 6 HOURS PRN
Qty: 1 EACH | Refills: 1 | Status: SHIPPED | OUTPATIENT
Start: 2021-12-21 | End: 2022-02-15 | Stop reason: SDUPTHER

## 2021-12-22 ENCOUNTER — OFFICE VISIT (OUTPATIENT)
Dept: INTERNAL MEDICINE CLINIC | Age: 31
End: 2021-12-22
Payer: COMMERCIAL

## 2021-12-22 VITALS
RESPIRATION RATE: 16 BRPM | DIASTOLIC BLOOD PRESSURE: 80 MMHG | OXYGEN SATURATION: 97 % | TEMPERATURE: 98.1 F | WEIGHT: 153.4 LBS | BODY MASS INDEX: 22.72 KG/M2 | HEART RATE: 85 BPM | SYSTOLIC BLOOD PRESSURE: 120 MMHG | HEIGHT: 69 IN

## 2021-12-22 DIAGNOSIS — Z00.00 ANNUAL PHYSICAL EXAM: ICD-10-CM

## 2021-12-22 LAB
HERPES TYPE 1/2 IGM COMBINED: 0.53 IV
HERPES TYPE I/II IGG COMBINED: 0.52 IV

## 2021-12-22 PROCEDURE — 99395 PREV VISIT EST AGE 18-39: CPT | Performed by: INTERNAL MEDICINE

## 2021-12-22 SDOH — ECONOMIC STABILITY: FOOD INSECURITY: WITHIN THE PAST 12 MONTHS, YOU WORRIED THAT YOUR FOOD WOULD RUN OUT BEFORE YOU GOT MONEY TO BUY MORE.: NEVER TRUE

## 2021-12-22 SDOH — ECONOMIC STABILITY: FOOD INSECURITY: WITHIN THE PAST 12 MONTHS, THE FOOD YOU BOUGHT JUST DIDN'T LAST AND YOU DIDN'T HAVE MONEY TO GET MORE.: NEVER TRUE

## 2021-12-22 ASSESSMENT — ENCOUNTER SYMPTOMS
TROUBLE SWALLOWING: 0
GASTROINTESTINAL NEGATIVE: 1
ALLERGIC/IMMUNOLOGIC NEGATIVE: 1
COUGH: 0
CHEST TIGHTNESS: 0
SHORTNESS OF BREATH: 0

## 2021-12-22 ASSESSMENT — SOCIAL DETERMINANTS OF HEALTH (SDOH): HOW HARD IS IT FOR YOU TO PAY FOR THE VERY BASICS LIKE FOOD, HOUSING, MEDICAL CARE, AND HEATING?: NOT HARD AT ALL

## 2021-12-22 NOTE — PROGRESS NOTES
2021    [facility] (:  1990) is a 27 y.o. male, here for a preventive medicine evaluation. Had an abscess removed from scalp  Has slight madison e in skin  in penile area and over around lips and  has no bleeding  Has no cough or wheezing  And has no heart burns   He wheezes when he tried to breath out hard but not otherwise  Had PFT done and are normal and seen Pulmonary MD and follows up with him  Has no symptoms with activity  Has no cp gi or gu symptoms  Feels he has spots over lipis and not bother him and also seen an skin discoloration over his glans and has no itching  Patient Active Problem List   Diagnosis    Cellulitis of face-left    Vertigo    Chronic cough    Reactive airway disease without complication    Idiopathic urticaria    Oral ulcer    Pimples    Scoliosis of thoracic spine       Review of Systems   Constitutional: Negative for activity change, appetite change and unexpected weight change. HENT: Negative for ear pain, hearing loss, nosebleeds, postnasal drip and trouble swallowing. Eyes: Negative for visual disturbance. Respiratory: Negative for cough, chest tightness and shortness of breath. Cardiovascular: Negative for chest pain, palpitations and leg swelling. Gastrointestinal: Negative. Genitourinary: Positive for frequency (during day and not at nights and has no other symptosm but drinks lot fo fluids but not coffeee). Negative for difficulty urinating, dysuria, hematuria, penile discharge, penile pain and urgency. Allergic/Immunologic: Negative. Neurological: Negative for dizziness, light-headedness and headaches. Prior to Visit Medications    Medication Sig Taking?  Authorizing Provider   albuterol sulfate  (90 Base) MCG/ACT inhaler Inhale 2 puffs into the lungs every 6 hours as needed for Wheezing  Patient not taking: Reported on 2021  Anthony Meredith MD   fluticasone-salmeterol (ADVAIR HFA) 853-97 MCG/ACT inhaler Inhale 2 puffs into the lungs 2 times daily  Patient not taking: Reported on 12/22/2021  Anthony Meredith MD        No Known Allergies    History reviewed. No pertinent past medical history. History reviewed. No pertinent surgical history. Social History     Socioeconomic History    Marital status: Single     Spouse name: Not on file    Number of children: Not on file    Years of education: Not on file    Highest education level: Not on file   Occupational History    Not on file   Tobacco Use    Smoking status: Never Smoker    Smokeless tobacco: Never Used   Vaping Use    Vaping Use: Never used   Substance and Sexual Activity    Alcohol use: No    Drug use: Not on file    Sexual activity: Not on file   Other Topics Concern    Not on file   Social History Narrative    Not on file     Social Determinants of Health     Financial Resource Strain: Low Risk     Difficulty of Paying Living Expenses: Not hard at all   Food Insecurity: No Food Insecurity    Worried About [address] [place] [address] in the Last Year: Never true    Joselo of Food in the Last Year: Never true   Transportation Needs: No Transportation Needs    Lack of Transportation (Medical): No    Lack of Transportation (Non-Medical):  No   Physical Activity:     Days of Exercise per Week: Not on file    Minutes of Exercise per Session: Not on file   Stress:     Feeling of Stress : Not on file   Social Connections:     Frequency of Communication with Friends and Family: Not on file    Frequency of Social Gatherings with Friends and Family: Not on file    Attends Yarsani Services: Not on file    Active Member of Clubs or Organizations: Not on file    Attends Club or Organization Meetings: Not on file    Marital Status: Not on file   Intimate Partner Violence:     Fear of Current or Ex-Partner: Not on file    Emotionally Abused: Not on file    Physically Abused: Not on file    Sexually Abused: Not on file   Housing Stability:     Unable to Pay for Housing in the Last Year: Not on file    Number of Places Lived in the Last Year: Not on file    Unstable Housing in the Last Year: Not on file        Family History   Problem Relation Age of Onset    Diabetes Father        ADVANCE DIRECTIVE: N, <no information>    Vitals:    12/22/21 1608   BP: 126/78   Site: Left Upper Arm   Position: Sitting   Cuff Size: Medium Adult   Pulse: 85   Resp: 16   Temp: 98.1 °F (36.7 °C)   SpO2: 97%   Weight: 153 lb 6.4 oz (69.6 kg)   Height: 5' 9\" (1.753 m)     Estimated body mass index is 22.65 kg/m² as calculated from the following:    Height as of this encounter: 5' 9\" (1.753 m). Weight as of this encounter: 153 lb 6.4 oz (69.6 kg). Physical Exam  Vitals and nursing note reviewed. Constitutional:       General: He is not in acute distress. HENT:      Head: Normocephalic. Right Ear: Tympanic membrane, ear canal and external ear normal. There is no impacted cerumen. Left Ear: Tympanic membrane, ear canal and external ear normal. There is no impacted cerumen. Nose: Nose normal. No congestion. Mouth/Throat:      Mouth: Mucous membranes are moist.      Pharynx: Oropharynx is clear. No oropharyngeal exudate or posterior oropharyngeal erythema. Eyes:      General: No scleral icterus. Extraocular Movements: Extraocular movements intact. Conjunctiva/sclera: Conjunctivae normal.      Pupils: Pupils are equal, round, and reactive to light. Neck:      Thyroid: No thyromegaly. Vascular: No carotid bruit or JVD. Cardiovascular:      Rate and Rhythm: Normal rate and regular rhythm. Pulses: Normal pulses. Heart sounds: Normal heart sounds. No murmur heard. No gallop. Pulmonary:      Effort: No respiratory distress. Breath sounds: Normal breath sounds. No wheezing, rhonchi or rales. Abdominal:      General: Bowel sounds are normal. There is no distension. Palpations: Abdomen is soft.  There is no hepatomegaly, splenomegaly or mass. Tenderness: There is no abdominal tenderness. Musculoskeletal:      Right lower leg: No edema. Left lower leg: No edema. Lymphadenopathy:      Cervical: No cervical adenopathy. Skin:     Comments: Has dry patch of skin over his glans penis and ha snos welling or drainage  aas pin head brown spots seen over right upper lip and hardly visible and not bale to feel them   Neurological:      Mental Status: He is alert and oriented to person, place, and time. No flowsheet data found. Lab Results   Component Value Date    CHOL 138 12/17/2021    CHOL 149 09/28/2020    TRIG 50 12/17/2021    TRIG 60 09/28/2020    HDL 51 12/17/2021    HDL 54 09/28/2020    LDLCALC 77 12/17/2021    LDLCALC 83 09/28/2020    GLUCOSE 98 12/17/2021       The ASCVD Risk score (Rinku Su, et al., 2013) failed to calculate for the following reasons:     The 2013 ASCVD risk score is only valid for ages 36 to 78    Immunization History   Administered Date(s) Administered    COVID-19, J&J, PF, 0.5 mL 04/07/2021    COVID-19, Moderna, Booster, PF, 50mcg/0.25ml 11/27/2021    DTaP vaccine 1990, 1989, 1989, 1989, 1989, 1989, 05/07/1991, 05/07/1991, 05/09/1994, 05/09/1994    HPV (Human Papilloma Virus)Vaccine 03/16/2018, 05/18/2018    HPV 9-valent Maurilio Miguel) 09/14/2020    Hepatitis B Adult (Engerix-B) 09/14/2020    Hepatitis B Ped/Adol (Engerix-B, Recombivax HB) 10/12/2001, 10/12/2001, 01/31/2002, 01/31/2002, 05/21/2002, 05/21/2002    Hepatitis B vaccine 05/18/2018, 06/22/2018    Hib vaccine 09/25/1990, 09/25/1990    Influenza Vaccine, unspecified formulation 11/16/2012    Influenza Virus Vaccine 11/16/2012, 02/01/2018    Influenza, MDCK Quadv, IM, PF (Flucelvax 2 yrs and older) 01/24/2020    Influenza, Caro Jackman, IM, PF (6 mo and older Fluzone, Flulaval, Fluarix, and 3 yrs and older Afluria) 10/09/2020    MMR 09/25/1990, 09/25/1990, 10/12/2001, 10/12/2001    Polio Virus Vaccine 1989, 1989, 1989, 1989, 05/07/1991, 05/07/1991, 05/09/1994, 05/09/1994    Tdap (Boostrix, Adacel) 06/14/2006, 06/14/2006, 03/16/2018       Health Maintenance   Topic Date Due    Pneumococcal 0-64 years Vaccine (1 of 2 - PPSV23) Never done    Flu vaccine (1) 09/01/2021    DTaP/Tdap/Td vaccine (8 - Td or Tdap) 03/16/2028    Hepatitis B vaccine  Completed    Hib vaccine  Completed    COVID-19 Vaccine  Completed    Hepatitis C screen  Completed    HIV screen  Completed    Hepatitis A vaccine  Aged Out    Meningococcal (ACWY) vaccine  Aged Out    Varicella vaccine  Discontinued          ASSESSMENT/PLAN:  Normal exam  chromic cough unchanged    skin areas he is feeling over upper lip and penile area -no evidence of ecze and more from dry skin     Plan  Discussed with him  If he feels skin spots are getting worse,need to see Dermatologist  Use moisturizing creams to his penile delphine 2-3 times a day  All his labs are normal.  Advised regular activity  An electronic signature was used to authenticate this note.     --Raji Gutierrez MD on 12/22/2021 at 4:17 PM

## 2021-12-22 NOTE — PATIENT INSTRUCTIONS
Discussed with him  If he feels skin spots are getting worse,need to see Dermatologist  Use moisturizing creams to his penile delphine 2-3 times a day  All his labs are normal.  Advised regular activity

## 2021-12-29 ENCOUNTER — TELEPHONE (OUTPATIENT)
Dept: INTERNAL MEDICINE CLINIC | Age: 31
End: 2021-12-29

## 2021-12-29 NOTE — TELEPHONE ENCOUNTER
Would like a referral to a Dermatoligist. Would like to be check out. abscess on his scalp .     Please call patient back at [phone]

## 2021-12-29 NOTE — TELEPHONE ENCOUNTER
[facility] be seen [facility] at [phone]  Can be seen by sil[facility]re other Dermatology office -if we know any one where he can get appt soon   If abscess is acute,[facility]

## 2022-01-21 PROBLEM — Z00.00 ANNUAL PHYSICAL EXAM: Status: RESOLVED | Noted: 2021-12-22 | Resolved: 2022-01-21

## 2022-02-12 DIAGNOSIS — J45.30 MILD PERSISTENT REACTIVE AIRWAY DISEASE WITHOUT COMPLICATION: ICD-10-CM

## 2022-02-12 DIAGNOSIS — R05.3 CHRONIC COUGH: ICD-10-CM

## 2022-02-15 ENCOUNTER — OFFICE VISIT (OUTPATIENT)
Dept: PULMONOLOGY | Age: 32
End: 2022-02-15
Payer: COMMERCIAL

## 2022-02-15 VITALS
HEIGHT: 69 IN | HEART RATE: 88 BPM | DIASTOLIC BLOOD PRESSURE: 72 MMHG | BODY MASS INDEX: 21.92 KG/M2 | WEIGHT: 148 LBS | SYSTOLIC BLOOD PRESSURE: 118 MMHG | OXYGEN SATURATION: 100 %

## 2022-02-15 DIAGNOSIS — J45.30 MILD PERSISTENT REACTIVE AIRWAY DISEASE WITHOUT COMPLICATION: ICD-10-CM

## 2022-02-15 PROCEDURE — 99214 OFFICE O/P EST MOD 30 MIN: CPT | Performed by: INTERNAL MEDICINE

## 2022-02-15 RX ORDER — ALBUTEROL SULFATE 90 UG/1
2 AEROSOL, METERED RESPIRATORY (INHALATION) EVERY 6 HOURS PRN
Qty: 1 EACH | Refills: 5 | Status: SHIPPED | OUTPATIENT
Start: 2022-02-15 | End: 2022-09-27 | Stop reason: SDUPTHER

## 2022-02-15 NOTE — PROGRESS NOTES
PULMONARY OFFICE FOLLOW-UP VISIT    CONSULTING PHYSICIAN:  Bambi Jimenez MD ,     REASON FOR VISIT:   Chief Complaint   Patient presents with    Cough     has improved    Wheezing     has improved        DATE OF VISIT: 2/15/2022    HISTORY OF PRESENT ILLNESS: 27y.o. year old male into the pulmonary office for a follow-up. Patient reports that his breathing has been stable. He is currently using Advair HFA. There are several days when he forgets to take the inhaler. Has not required to use albuterol. Denies any nasal symptoms. Previously: Patient reports that his breathing is slowly stabilizing. He still has some episodes where he feels short of breath. Cough is improved drastically. He is using Advair HFA inhaler regularly. Patient reports that in the summer 2020 he was exposed to some dust and smoke which led to shortness of breath, wheezing and chest tightness. After a few days symptoms subsided but then he started to have episodic cough and shortness of breath. He was prescribed with Flovent HFA by his primary care physician which has provided him with partial relief. He still continues to have exertional shortness of breath associated with wheezing. Cough is very intermittent and dry. No expectoration. Denies any nasal symptoms. No family history of lung diseases. Patient did not have any breathing issues in his childhood. REVIEW OF SYSTEMS:   8 point review of system is negative except that mentioned in the HPI. PAST MEDICAL HISTORY: No past medical history on file. PAST SURGICAL HISTORY: No past surgical history on file.     SOCIAL HISTORY:   Social History     Tobacco Use    Smoking status: Never Smoker    Smokeless tobacco: Never Used   Vaping Use    Vaping Use: Never used   Substance Use Topics    Alcohol use: No    Drug use: Not on file       FAMILY HISTORY:   Family History   Problem Relation Age of Onset    Diabetes Father        MEDICATIONS:     No current outpatient medications on file prior to visit. No current facility-administered medications on file prior to visit. ALLERGIES:   Allergies as of 02/15/2022    (No Known Allergies)      OBJECTIVE:   height is 5' 9\" (1.753 m) and weight is 148 lb (67.1 kg). His blood pressure is 118/72 and his pulse is 88. His oxygen saturation is 100%. PHYSICAL EXAM:    CONSTITUTIONAL: He is a 27y.o.-year-old who appears his stated age. He is alert and oriented x 3 and in no acute distress. HEENT: PERRLA, EOMI. No scleral icterus. No thrush, atraumatic, normocephalic. NECK: Supple, without cervical or supraclavicular lymphadenopathy:  CARDIOVASCULAR: S1 S2 RRR. Without murmer  RESPIRATORY & CHEST: Lungs are clear to auscultation and percussion. No wheezing, no crackles. Good air movement  GASTROINTESTINAL & ABDOMEN: Soft, nontender, positive bowel sounds in all quadrants, non-distended, without hepatosplenomegaly. GENITOURINARY: Deferred. MUSCULOSKELETAL: No tenderness to palpation of the axial skeleton. There is no clubbing. No cyanosis. No edema of the lower extremities. SKIN OF BODY: No rash or jaundice. PSYCHIATRIC EVALUATION: Normal affect. Patient answers questions appropriately. HEMATOLOGIC/LYMPHATIC/ IMMUNOLOGIC: No palpable lymphadenopathy. NEUROLOGIC: Alert and oriented x 3. Groslly non-focal. Motor strength is 5+/5 in all muscle groups. The patient has a normal sensorium globally.       LABS:    Lab Summary Latest Ref Rng & Units 12/17/2021 9/28/2020   WBC 4.0 - 11.0 K/uL 7.6 5.0   Hgb 13.5 - 17.5 g/dL 15.5 15.7   Hct 40.5 - 52.5 % 47.2 47.0   Platelets 771 - 289 K/uL 222 243   Sodium 136 - 145 mmol/L 141 140   Potassium 3.5 - 5.1 mmol/L 5.6(H) 4.9   BUN 7 - 20 mg/dL 16 14   Creatinine 0.9 - 1.3 mg/dL 0.8(L) 0.9   Glucose 70 - 99 mg/dL 98 88   Calcium 8.3 - 10.6 mg/dL 9.2 9.9   Alk Phos 40 - 129 U/L 79 80   Albumin 3.4 - 5.0 g/dL 4.8 4.6   Bilirubin 0.0 - 1.0 mg/dL 0.3 0.5 AST 15 - 37 U/L 37 19   ALT 10 - 40 U/L 28 20   HDL cholesterol 40 - 60 mg/dL 51 54   Triglycerides 0 - 150 mg/dL 50 60   LDL calc <100 mg/dL 77 83   VLDL calc Not Established mg/dL 10 12   TSH 0.27 - 4.20 uIU/mL - 2.54   Some recent data might be hidden         IMAGING:    No new chest imaging for me to review. Pulmonary Functions Testing Results:    Complete PFT done on 9/24/2020  FEV1 prebronchodilator 4.02/105 post 4.30/13.  7% change  FVC prebronchodilator 4.81/105 post 4.92/8.  2% change  FEV1/FVC prebronchodilator 84 post 87  Total lung capacity: 89%  Residual volume: 128%  Diffusion capacity: 79%    This complete PFT was personally viewed by me and my interpretation is: Spirometry is within normal limits. Evidence of air trapping. Near normal diffusion capacity. IMPRESSION AND RECOMMENDATIONS:     1. Chronic cough  -His cough has improved. -Strong suspicion that patient may have mild intermittent asthma. 2. Mild persistent reactive airway disease without complication  -I will continue him with Advair HFA as before.    -In addition I will add albuterol HFA provided with as needed short acting bronchodilators.  -Did advise him to use it more consistently during the winter months.  -Advised patient to avoid any potential triggers that he can identify.  -Patient did get total IgE level and respiratory allergen panel done. Results are pending. Return in about 6 months (around 8/15/2022). Marce Owen MD  Pulmonary Critical Care and Sleep Medicine  2/15/2022, 3:21 PM    This note was completed using dragon medical speech recognition software. Grammatical errors, random word insertions, pronoun errors and incomplete sentences are occasional consequences of this technology due to software limitations.  If there are questions or concerns about the content of this note of information contained within the body of this dictation they should be addressed with the provider for clarification.

## 2022-02-18 LAB
ALLERGEN BERMUDA GRASS IGE: <0.1 KU/L (ref 0–0.34)
ALLERGEN BIRCH IGE: <0.1 KU/L (ref 0–0.34)
ALLERGEN DOG DANDER IGE: <0.1 KU/L (ref 0–0.34)
ALLERGEN GERMAN COCKROACH IGE: 2.38 KU/L (ref 0–0.34)
ALLERGEN HORMODENDRUM IGE: <0.1 KUL/L (ref 0–0.34)
ALLERGEN MOUSE EPITHELIA IGE: <0.1 KU/L (ref 0–0.34)
ALLERGEN OAK TREE IGE: <0.1 KU/L (ref 0–0.34)
ALLERGEN PECAN TREE IGE: 0.7 KU/L (ref 0–0.34)
ALLERGEN PIGWEED ROUGH IGE: <0.1 KU/L (ref 0–0.34)
ALLERGEN SHEEP SORREL (W18) IGE: <0.1 KU/L (ref 0–0.34)
ALLERGEN TREE SYCAMORE: <0.1 KU/L (ref 0–0.34)
ALLERGEN WALNUT TREE IGE: 0.52 KU/L (ref 0–0.34)
ALLERGEN WHITE MULBERRY TREE, IGE: <0.1 KU/L (ref 0–0.34)
ALLERGEN, TREE, WHITE ASH IGE: 0.17 KU/L (ref 0–0.34)
ALTERNARIA ALTERNATA: 0.68 KU/L (ref 0–0.34)
ASPERGILLUS FUMIGATUS: <0.1 KU/L (ref 0–0.34)
CAT DANDER ANTIBODY: <0.1 KU/L (ref 0–0.34)
COTTONWOOD TREE: <0.1 KU/L (ref 0–0.34)
D. FARINAE: 1.33 KU/L (ref 0–0.34)
D. PTERONYSSINUS: 1.21 KU/L (ref 0–0.34)
ELM TREE: 0.23 KU/L (ref 0–0.34)
IGE: 88 IU/ML
IGE: 90 KU/L
MAPLE/BOXELDER TREE: 0.2 KU/L (ref 0–0.34)
MOUNTAIN CEDAR TREE: 0.68 KU/L (ref 0–0.34)
MUCOR RACEMOSUS: <0.1 KU/L (ref 0–0.34)
P. NOTATUM: <0.1 KU/L (ref 0–0.34)
RUSSIAN THISTLE: <0.1 KU/L (ref 0–0.34)
SHORT RAGWD(A ARTEMIS.) IGE: 1.91 KU/L (ref 0–0.34)
TIMOTHY GRASS: 0.29 KU/L (ref 0–0.34)

## 2022-02-23 ENCOUNTER — TELEPHONE (OUTPATIENT)
Dept: PULMONOLOGY | Age: 32
End: 2022-02-23

## 2022-02-23 NOTE — TELEPHONE ENCOUNTER
Patient called saying he was returning Paula's call about blood work. Please call patient at [phone].

## 2022-09-22 ENCOUNTER — TELEPHONE (OUTPATIENT)
Dept: INTERNAL MEDICINE CLINIC | Age: 32
End: 2022-09-22

## 2022-09-22 ENCOUNTER — OFFICE VISIT (OUTPATIENT)
Dept: INTERNAL MEDICINE CLINIC | Age: 32
End: 2022-09-22
Payer: COMMERCIAL

## 2022-09-22 VITALS
OXYGEN SATURATION: 100 % | HEART RATE: 81 BPM | BODY MASS INDEX: 23.19 KG/M2 | WEIGHT: 153 LBS | SYSTOLIC BLOOD PRESSURE: 110 MMHG | DIASTOLIC BLOOD PRESSURE: 77 MMHG | HEIGHT: 68 IN | TEMPERATURE: 97.9 F

## 2022-09-22 DIAGNOSIS — Z91.89 AT RISK FOR SEXUALLY TRANSMITTED DISEASE DUE TO PARTNER WITH SEXUALLY TRANSMITTED DISEASE: ICD-10-CM

## 2022-09-22 DIAGNOSIS — Z76.89 ENCOUNTER TO ESTABLISH CARE WITH NEW DOCTOR: Primary | ICD-10-CM

## 2022-09-22 DIAGNOSIS — J45.30 MILD PERSISTENT REACTIVE AIRWAY DISEASE WITHOUT COMPLICATION: ICD-10-CM

## 2022-09-22 PROCEDURE — 99203 OFFICE O/P NEW LOW 30 MIN: CPT | Performed by: STUDENT IN AN ORGANIZED HEALTH CARE EDUCATION/TRAINING PROGRAM

## 2022-09-22 ASSESSMENT — ENCOUNTER SYMPTOMS
BACK PAIN: 0
COUGH: 0
SORE THROAT: 0
VOMITING: 0
EYE REDNESS: 0
EYE DISCHARGE: 0
RHINORRHEA: 0
NAUSEA: 0
ABDOMINAL PAIN: 0
CHEST TIGHTNESS: 0

## 2022-09-22 ASSESSMENT — PATIENT HEALTH QUESTIONNAIRE - PHQ9
1. LITTLE INTEREST OR PLEASURE IN DOING THINGS: 0
SUM OF ALL RESPONSES TO PHQ QUESTIONS 1-9: 0
2. FEELING DOWN, DEPRESSED OR HOPELESS: 0
SUM OF ALL RESPONSES TO PHQ9 QUESTIONS 1 & 2: 0
SUM OF ALL RESPONSES TO PHQ QUESTIONS 1-9: 0

## 2022-09-22 NOTE — PROGRESS NOTES
Carmina Jolley (:  1990) is a 34 y.o. male,New patient, here for establishment of care and discussion about labs. His former girlfirend tested positive yesterday for HSV 2, he was sexually active with her in November, last year. He has not had a new partner since then and has not been sexually active    Chronic medical condition:  H/o chronic cough  # Mild persistent reactive airway disease without complication  Complete PFT done on 2020  FEV1 prebronchodilator 4.02/105 post 4.30/13.  7% change  FVC prebronchodilator 4.81/105 post 4.92/8.  2% change  FEV1/FVC prebronchodilator 84 post 87  Total lung capacity: 89%  Residual volume: 128%  Diffusion capacity: 79%    Routinely follows up with pulmonologist Dr Florence South MD.  Not using any inhaler right now. 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 He does not smoke, drinks alcohol occasionally twice a year. ASSESSMENT/PLAN:  1. Encounter to establish care with new doctor  Medications history, social history, family history reviewed with the patient. 2. At risk for sexually transmitted disease due to partner with sexually transmitted disease  His ex-girlfriend found to have herpes simplex virus 2 (mildly elevated titer)  The patient is asymptomatic-no rashes, skin lesion, lymphadenopathy, myalgia, joint pain, fever or chills. -     Herpes simplex virus (HSV) I/II antibodies IgG & IgM w/ reflex; Future  -     RPR Reflex; Future  -     C.trachomatis N.gonorrhoeae DNA, Urine; Future  -     HIV Screen; Future    3. Mild persistent reactive airway disease without complication-well-controlled, not required use of inhalers recently. Managed by pulmonologist.    Return in about 6 months (around 3/22/2023) for annual physical examination. Subjective   SUBJECTIVE/OBJECTIVE:  HPI    Review of Systems   Constitutional:  Negative for chills, fatigue and fever.    HENT:  Negative for congestion, ear pain, rhinorrhea and sore throat. Eyes:  Negative for discharge, redness and visual disturbance. Respiratory:  Negative for cough and chest tightness. Cardiovascular:  Negative for chest pain, palpitations and leg swelling. Gastrointestinal:  Negative for abdominal pain, nausea and vomiting. Endocrine: Negative. Genitourinary:  Negative for dysuria. Musculoskeletal:  Negative for back pain and gait problem. Skin: Negative. Neurological:  Negative for syncope, facial asymmetry, speech difficulty, light-headedness and headaches. Objective   Physical Exam  Vitals reviewed. Constitutional:       Appearance: Normal appearance. HENT:      Head: Normocephalic. Eyes:      Extraocular Movements: Extraocular movements intact. Pupils: Pupils are equal, round, and reactive to light. Cardiovascular:      Rate and Rhythm: Normal rate. Heart sounds: Normal heart sounds. No murmur heard. Pulmonary:      Effort: Pulmonary effort is normal.      Breath sounds: Normal breath sounds. Abdominal:      General: Bowel sounds are normal.      Palpations: Abdomen is soft. Musculoskeletal:         General: Normal range of motion. Skin:     General: Skin is warm. Neurological:      General: No focal deficit present. Mental Status: He is alert and oriented to person, place, and time. Mental status is at baseline. Psychiatric:         Mood and Affect: Mood normal.      Please note that this chart was generated using dragon dictation software. Although every effort was made to ensure the accuracy of this automated transcription, some errors in transcription may have occurred. An electronic signature was used to authenticate this note.     --Eliana Barrera MD

## 2022-09-24 LAB
C. TRACHOMATIS DNA ,URINE: NEGATIVE
HERPES TYPE 1/2 IGM COMBINED: 0.44 IV
HERPES TYPE I/II IGG COMBINED: 0.72 IV
N. GONORRHOEAE DNA, URINE: NEGATIVE

## 2022-09-26 ENCOUNTER — TELEPHONE (OUTPATIENT)
Dept: INTERNAL MEDICINE CLINIC | Age: 32
End: 2022-09-26

## 2022-09-27 ENCOUNTER — OFFICE VISIT (OUTPATIENT)
Dept: PULMONOLOGY | Age: 32
End: 2022-09-27
Payer: COMMERCIAL

## 2022-09-27 VITALS
SYSTOLIC BLOOD PRESSURE: 110 MMHG | HEIGHT: 68 IN | HEART RATE: 80 BPM | OXYGEN SATURATION: 100 % | WEIGHT: 151 LBS | BODY MASS INDEX: 22.88 KG/M2 | DIASTOLIC BLOOD PRESSURE: 68 MMHG

## 2022-09-27 DIAGNOSIS — R05.3 CHRONIC COUGH: ICD-10-CM

## 2022-09-27 DIAGNOSIS — J45.20 MILD INTERMITTENT ASTHMA WITHOUT COMPLICATION: Primary | ICD-10-CM

## 2022-09-27 DIAGNOSIS — J45.30 MILD PERSISTENT REACTIVE AIRWAY DISEASE WITHOUT COMPLICATION: ICD-10-CM

## 2022-09-27 PROCEDURE — 99214 OFFICE O/P EST MOD 30 MIN: CPT | Performed by: INTERNAL MEDICINE

## 2022-09-27 RX ORDER — ALBUTEROL SULFATE 90 UG/1
2 AEROSOL, METERED RESPIRATORY (INHALATION) EVERY 6 HOURS PRN
Qty: 1 EACH | Refills: 5 | Status: SHIPPED | OUTPATIENT
Start: 2022-09-27

## 2022-09-27 NOTE — PROGRESS NOTES
PULMONARY OFFICE FOLLOW-UP VISIT    CONSULTING PHYSICIAN:  Cyn Cardenas MD ,     REASON FOR VISIT:   Chief Complaint   Patient presents with    Cough     6 MO f/u       DATE OF VISIT: 9/27/2022    HISTORY OF PRESENT ILLNESS: 34y.o. year old male into the pulmonary office for a follow-up. Patient reports that for the most part his breathing has been stable. At nighttime when he is laying down in the bed he occasionally feels that his breathing is not 100%. When asked if he has any wheezing or shortness of breath he denies it. No cough or discolored expectoration. He has not been taking albuterol albuterol. Denies any nasal symptoms. Uses 1 pillow at nighttime. Previously: Patient reports that his breathing is slowly stabilizing. He still has some episodes where he feels short of breath. Cough is improved drastically. He is using Advair HFA inhaler regularly. Patient reports that in the summer 2020 he was exposed to some dust and smoke which led to shortness of breath, wheezing and chest tightness. After a few days symptoms subsided but then he started to have episodic cough and shortness of breath. He was prescribed with Flovent HFA by his primary care physician which has provided him with partial relief. He still continues to have exertional shortness of breath associated with wheezing. Cough is very intermittent and dry. No expectoration. Denies any nasal symptoms. No family history of lung diseases. Patient did not have any breathing issues in his childhood. REVIEW OF SYSTEMS:   8 point review of system is negative except that mentioned in the HPI. PAST MEDICAL HISTORY: No past medical history on file. PAST SURGICAL HISTORY: No past surgical history on file.     SOCIAL HISTORY:   Social History     Tobacco Use    Smoking status: Never    Smokeless tobacco: Never   Vaping Use    Vaping Use: Never used   Substance Use Topics    Alcohol use: Yes     Comment: once every 6 months. FAMILY HISTORY:   Family History   Problem Relation Age of Onset    Diabetes Father        MEDICATIONS:     No current outpatient medications on file prior to visit. No current facility-administered medications on file prior to visit. ALLERGIES:   Allergies as of 09/27/2022    (No Known Allergies)      OBJECTIVE:   height is 5' 8\" (1.727 m) and weight is 151 lb (68.5 kg). His blood pressure is 110/68 and his pulse is 80. His oxygen saturation is 100%. PHYSICAL EXAM:    CONSTITUTIONAL: He is a 34y.o.-year-old who appears his stated age. He is alert and oriented x 3 and in no acute distress. HEENT: PERRLA, EOMI. No scleral icterus. No thrush, atraumatic, normocephalic. NECK: Supple, without cervical or supraclavicular lymphadenopathy:  CARDIOVASCULAR: S1 S2 RRR. Without murmer  RESPIRATORY & CHEST: Lungs are clear to auscultation and percussion. No wheezing, no crackles. Good air movement  GASTROINTESTINAL & ABDOMEN: Soft, nontender, positive bowel sounds in all quadrants, non-distended, without hepatosplenomegaly. GENITOURINARY: Deferred. MUSCULOSKELETAL: No tenderness to palpation of the axial skeleton. There is no clubbing. No cyanosis. No edema of the lower extremities. SKIN OF BODY: No rash or jaundice. PSYCHIATRIC EVALUATION: Normal affect. Patient answers questions appropriately. HEMATOLOGIC/LYMPHATIC/ IMMUNOLOGIC: No palpable lymphadenopathy. NEUROLOGIC: Alert and oriented x 3. Groslly non-focal. Motor strength is 5+/5 in all muscle groups. The patient has a normal sensorium globally.       LABS:    Lab Summary Latest Ref Rng & Units 12/17/2021 9/28/2020   WBC 4.0 - 11.0 K/uL 7.6 5.0   Hgb 13.5 - 17.5 g/dL 15.5 15.7   Hct 40.5 - 52.5 % 47.2 47.0   Platelets 287 - 893 K/uL 222 243   Sodium 136 - 145 mmol/L 141 140   Potassium 3.5 - 5.1 mmol/L 5.6(H) 4.9   BUN 7 - 20 mg/dL 16 14   Creatinine 0.9 - 1.3 mg/dL 0.8(L) 0.9   Glucose 70 - 99 mg/dL 98 88   Calcium 8.3 - 10.6 mg/dL 9.2 9.9   Alk Phos 40 - 129 U/L 79 80   Albumin 3.4 - 5.0 g/dL 4.8 4.6   Bilirubin 0.0 - 1.0 mg/dL 0.3 0.5   AST 15 - 37 U/L 37 19   ALT 10 - 40 U/L 28 20   HDL cholesterol 40 - 60 mg/dL 51 54   Triglycerides 0 - 150 mg/dL 50 60   LDL calc <100 mg/dL 77 83   VLDL calc Not Established mg/dL 10 12   TSH 0.27 - 4.20 uIU/mL - 2.54   Some recent data might be hidden         IMAGING:    No new chest imaging for me to review. Pulmonary Functions Testing Results:    Complete PFT done on 9/24/2020  FEV1 prebronchodilator 4.02/105 post 4.30/13.  7% change  FVC prebronchodilator 4.81/105 post 4.92/8.  2% change  FEV1/FVC prebronchodilator 84 post 87  Total lung capacity: 89%  Residual volume: 128%  Diffusion capacity: 79%    This complete PFT was personally viewed by me and my interpretation is: Spirometry is within normal limits. Evidence of air trapping. Near normal diffusion capacity. Total IgE done on 2/12/2022: 90  Respiratory allergen panel done on 2/12/2022: Positive for Alternaria, mountain cedar tree, dust mites, short ragweed, cockroach is, walnut tree, pecan tree      IMPRESSION AND RECOMMENDATIONS:     1. Chronic cough  -His cough has improved. -Strong suspicion that patient may have mild intermittent asthma. 2. Mild persistent reactive airway disease without complication  -I have strongly urged the patient to use his Advair inhaler regularly at least during the winter and spring season.  -I continue to use albuterol HFA provided with as needed.  -At nighttime he can use 2 pillows instead of 1 to provide mechanical ventilation for his lungs. Additionally he can take 1 puff of albuterol if he is feeling short of breath at nighttime.  -His respiratory allergen panel showed allergies to dust mites and various vegetations.  -Patient has atopic asthma for which she should take his inhalers regularly. Return in about 6 months (around 3/27/2023).          Priya Ely MD  Pulmonary Critical Care and Sleep Medicine  9/27/2022, 3:49 PM    This note was completed using dragon medical speech recognition software. Grammatical errors, random word insertions, pronoun errors and incomplete sentences are occasional consequences of this technology due to software limitations. If there are questions or concerns about the content of this note of information contained within the body of this dictation they should be addressed with the provider for clarification.

## 2023-08-21 ASSESSMENT — ENCOUNTER SYMPTOMS
EYE REDNESS: 0
BACK PAIN: 0
SORE THROAT: 0
EYE DISCHARGE: 0
CHEST TIGHTNESS: 0
COUGH: 0
VOMITING: 0
RHINORRHEA: 0
ABDOMINAL PAIN: 0
NAUSEA: 0

## 2023-08-21 NOTE — PROGRESS NOTES
Kayla Peck (:  1990) is a 28 y.o. male,New patient, here for establishment of care and discussion about labs. Chronic medical condition:  H/o chronic cough  # Mild persistent reactive airway disease without complication  Complete PFT done on 2020  FEV1 prebronchodilator 4.02/105 post 4.30/13.  7% change  FVC prebronchodilator 4.81/105 post 4.92/8.  2% change  FEV1/FVC prebronchodilator 84 post 87  Total lung capacity: 89%  Residual volume: 128%  Diffusion capacity: 79%    Routinely follows up with pulmonologist Dr Ani Lombardi MD.  Not using any inhaler right now. 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 He does not smoke, drinks alcohol occasionally twice a year. ASSESSMENT/PLAN:    1. Encounter for well adult exam without abnormal findings-patient is here for annual wellness visit and has his paperwork to be filled. Labs ordered, paper to be filled once the results are back. -     Lipid, Fasting; Future  -     Basic Metabolic Panel; Future  2. Mild persistent reactive airway disease without complication-stable, continue with use of albuterol as needed. He has not required his inhaler for a while. Return in about 1 year (around 2024) for annual physical examination. Subjective   SUBJECTIVE/OBJECTIVE:  HPI    Review of Systems   Constitutional:  Negative for chills, fatigue and fever. HENT:  Negative for congestion, ear pain, rhinorrhea and sore throat. Eyes:  Negative for discharge, redness and visual disturbance. Respiratory:  Negative for cough and chest tightness. Cardiovascular:  Negative for chest pain, palpitations and leg swelling. Gastrointestinal:  Negative for abdominal pain, nausea and vomiting. Endocrine: Negative. Genitourinary:  Negative for dysuria. Musculoskeletal:  Negative for back pain and gait problem. Skin: Negative.     Neurological:  Negative for syncope, facial asymmetry, speech difficulty, light-headedness

## 2023-08-22 ENCOUNTER — OFFICE VISIT (OUTPATIENT)
Dept: INTERNAL MEDICINE CLINIC | Age: 33
End: 2023-08-22
Payer: COMMERCIAL

## 2023-08-22 VITALS
OXYGEN SATURATION: 99 % | HEART RATE: 79 BPM | WEIGHT: 154 LBS | SYSTOLIC BLOOD PRESSURE: 108 MMHG | HEIGHT: 68 IN | BODY MASS INDEX: 23.34 KG/M2 | DIASTOLIC BLOOD PRESSURE: 70 MMHG

## 2023-08-22 DIAGNOSIS — J45.30 MILD PERSISTENT REACTIVE AIRWAY DISEASE WITHOUT COMPLICATION: ICD-10-CM

## 2023-08-22 DIAGNOSIS — Z00.00 ENCOUNTER FOR WELL ADULT EXAM WITHOUT ABNORMAL FINDINGS: Primary | ICD-10-CM

## 2023-08-22 LAB
ANION GAP SERPL CALCULATED.3IONS-SCNC: 13 MMOL/L (ref 3–16)
BUN SERPL-MCNC: 12 MG/DL (ref 7–20)
CALCIUM SERPL-MCNC: 9.8 MG/DL (ref 8.3–10.6)
CHLORIDE SERPL-SCNC: 104 MMOL/L (ref 99–110)
CHOLEST SERPL-MCNC: 143 MG/DL (ref 0–199)
CO2 SERPL-SCNC: 24 MMOL/L (ref 21–32)
CREAT SERPL-MCNC: 1 MG/DL (ref 0.9–1.3)
GFR SERPLBLD CREATININE-BSD FMLA CKD-EPI: >60 ML/MIN/{1.73_M2}
GLUCOSE SERPL-MCNC: 89 MG/DL (ref 70–99)
HDLC SERPL-MCNC: 52 MG/DL (ref 40–60)
LDL CHOLESTEROL CALCULATED: 77 MG/DL
POTASSIUM SERPL-SCNC: 4.3 MMOL/L (ref 3.5–5.1)
SODIUM SERPL-SCNC: 141 MMOL/L (ref 136–145)
TRIGL SERPL-MCNC: 68 MG/DL (ref 0–150)
VLDLC SERPL CALC-MCNC: 14 MG/DL

## 2023-08-22 PROCEDURE — 99213 OFFICE O/P EST LOW 20 MIN: CPT | Performed by: STUDENT IN AN ORGANIZED HEALTH CARE EDUCATION/TRAINING PROGRAM

## 2023-08-22 PROCEDURE — 99395 PREV VISIT EST AGE 18-39: CPT | Performed by: STUDENT IN AN ORGANIZED HEALTH CARE EDUCATION/TRAINING PROGRAM

## 2023-08-22 PROCEDURE — 36415 COLL VENOUS BLD VENIPUNCTURE: CPT | Performed by: STUDENT IN AN ORGANIZED HEALTH CARE EDUCATION/TRAINING PROGRAM

## 2023-08-22 SDOH — ECONOMIC STABILITY: INCOME INSECURITY: HOW HARD IS IT FOR YOU TO PAY FOR THE VERY BASICS LIKE FOOD, HOUSING, MEDICAL CARE, AND HEATING?: NOT HARD AT ALL

## 2023-08-22 SDOH — ECONOMIC STABILITY: FOOD INSECURITY: WITHIN THE PAST 12 MONTHS, YOU WORRIED THAT YOUR FOOD WOULD RUN OUT BEFORE YOU GOT MONEY TO BUY MORE.: NEVER TRUE

## 2023-08-22 SDOH — ECONOMIC STABILITY: HOUSING INSECURITY
IN THE LAST 12 MONTHS, WAS THERE A TIME WHEN YOU DID NOT HAVE A STEADY PLACE TO SLEEP OR SLEPT IN A SHELTER (INCLUDING NOW)?: NO

## 2023-08-22 SDOH — ECONOMIC STABILITY: FOOD INSECURITY: WITHIN THE PAST 12 MONTHS, THE FOOD YOU BOUGHT JUST DIDN'T LAST AND YOU DIDN'T HAVE MONEY TO GET MORE.: NEVER TRUE

## 2023-08-22 ASSESSMENT — PATIENT HEALTH QUESTIONNAIRE - PHQ9
SUM OF ALL RESPONSES TO PHQ QUESTIONS 1-9: 0
SUM OF ALL RESPONSES TO PHQ9 QUESTIONS 1 & 2: 0
SUM OF ALL RESPONSES TO PHQ QUESTIONS 1-9: 0
1. LITTLE INTEREST OR PLEASURE IN DOING THINGS: 0
2. FEELING DOWN, DEPRESSED OR HOPELESS: 0

## 2023-08-24 ENCOUNTER — TELEPHONE (OUTPATIENT)
Dept: INTERNAL MEDICINE CLINIC | Age: 33
End: 2023-08-24

## 2023-08-24 NOTE — TELEPHONE ENCOUNTER
Employee health screening form has been corrected to show his weight and height- refaxed and uploaded to patient's My Chart Attending Only

## 2023-08-24 NOTE — TELEPHONE ENCOUNTER
Pt called regarding wellness paperwork. Insurance company called saying there was info missing.  Please call him at [phone]    Thank you

## 2023-09-13 ENCOUNTER — OFFICE VISIT (OUTPATIENT)
Dept: INTERNAL MEDICINE CLINIC | Age: 33
End: 2023-09-13
Payer: COMMERCIAL

## 2023-09-13 VITALS
OXYGEN SATURATION: 99 % | DIASTOLIC BLOOD PRESSURE: 68 MMHG | TEMPERATURE: 98.2 F | SYSTOLIC BLOOD PRESSURE: 110 MMHG | WEIGHT: 159.13 LBS | BODY MASS INDEX: 24.19 KG/M2 | HEART RATE: 82 BPM

## 2023-09-13 DIAGNOSIS — R59.0 CERVICAL LYMPHADENOPATHY: ICD-10-CM

## 2023-09-13 DIAGNOSIS — J03.90 ACUTE TONSILLITIS, UNSPECIFIED ETIOLOGY: Primary | ICD-10-CM

## 2023-09-13 PROCEDURE — 99214 OFFICE O/P EST MOD 30 MIN: CPT | Performed by: STUDENT IN AN ORGANIZED HEALTH CARE EDUCATION/TRAINING PROGRAM

## 2023-09-13 RX ORDER — AMOXICILLIN AND CLAVULANATE POTASSIUM 875; 125 MG/1; MG/1
1 TABLET, FILM COATED ORAL 2 TIMES DAILY
Qty: 10 TABLET | Refills: 0 | Status: SHIPPED | OUTPATIENT
Start: 2023-09-13 | End: 2023-09-18 | Stop reason: SDUPTHER

## 2023-09-13 ASSESSMENT — ENCOUNTER SYMPTOMS
SWOLLEN GLANDS: 1
COUGH: 0

## 2023-09-13 NOTE — PROGRESS NOTES
Mohit Daniels (:  1990) is a 28 y.o. male,Established patient, here for evaluation of the following chief complaint(s):  Pain (States that he has throat pain. States that he is not sick, not a sore throat. Has had this for about 4-5 days.)    Chronic medical condition:  H/o chronic cough  # Mild persistent reactive airway disease without complication  Complete PFT done on 2020  FEV1 prebronchodilator 4.02/105 post 4.30/13.  7% change  FVC prebronchodilator 4.81/105 post 4.92/8.  2% change  FEV1/FVC prebronchodilator 84 post 87  Total lung capacity: 89%  Residual volume: 128%  Diffusion capacity: 79%     Routinely follows up with pulmonologist Dr Roula Castillo MD.  Not using any inhaler right now. 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 He does not smoke, drinks alcohol occasionally twice a year       ASSESSMENT/PLAN:  1. Cervical lymphadenopathy-new, on his right neck. Treat with Augmentin for 5 days. -     amoxicillin-clavulanate (AUGMENTIN) 875-125 MG per tablet; Take 1 tablet by mouth 2 times daily for 5 days, Disp-10 tablet, R-0Normal  2. Acute tonsillitis, unspecified etiology-new, start on Augmentin. -     amoxicillin-clavulanate (AUGMENTIN) 875-125 MG per tablet; Take 1 tablet by mouth 2 times daily for 5 days, Disp-10 tablet, R-0Normal      Return in about 6 months (around 3/13/2024), or if symptoms worsen or fail to improve. Subjective   SUBJECTIVE/OBJECTIVE:  Pharyngitis  This is a new problem. Episode onset: For the past 5 days. The problem occurs constantly. The problem has been gradually worsening. Associated symptoms include swollen glands. Pertinent negatives include no chills, congestion, coughing, fatigue, fever, headaches or neck pain. Associated symptoms comments: Trouble swallowing. .       Review of Systems   Constitutional:  Negative for chills, fatigue and fever. HENT:  Negative for congestion. Respiratory:  Negative for cough.

## 2023-09-16 LAB — BACTERIA THROAT AEROBE CULT: NORMAL

## 2023-09-18 DIAGNOSIS — J03.90 ACUTE TONSILLITIS, UNSPECIFIED ETIOLOGY: ICD-10-CM

## 2023-09-18 DIAGNOSIS — R59.0 CERVICAL LYMPHADENOPATHY: ICD-10-CM

## 2023-09-18 RX ORDER — AMOXICILLIN AND CLAVULANATE POTASSIUM 875; 125 MG/1; MG/1
1 TABLET, FILM COATED ORAL 2 TIMES DAILY
Qty: 10 TABLET | Refills: 0 | Status: SHIPPED | OUTPATIENT
Start: 2023-09-18 | End: 2023-09-23

## 2023-10-03 ENCOUNTER — TELEPHONE (OUTPATIENT)
Dept: INTERNAL MEDICINE CLINIC | Age: 33
End: 2023-10-03

## 2023-10-03 DIAGNOSIS — Z91.89 AT RISK FOR SEXUALLY TRANSMITTED DISEASE DUE TO PARTNER WITH SEXUALLY TRANSMITTED DISEASE: ICD-10-CM

## 2023-10-03 DIAGNOSIS — Z91.89 AT RISK FOR SEXUALLY TRANSMITTED DISEASE DUE TO PARTNER WITH SEXUALLY TRANSMITTED DISEASE: Primary | ICD-10-CM

## 2023-10-04 LAB
C TRACH DNA UR QL NAA+PROBE: NEGATIVE
HIV 1+2 AB+HIV1 P24 AG SERPL QL IA: NORMAL
HIV 2 AB SERPL QL IA: NORMAL
HIV1 AB SERPL QL IA: NORMAL
HIV1 P24 AG SERPL QL IA: NORMAL
N GONORRHOEA DNA UR QL NAA+PROBE: NEGATIVE
REAGIN+T PALLIDUM IGG+IGM SERPL-IMP: NORMAL

## 2023-10-06 LAB
HSV1+2 IGG SER IA-ACNC: 0.62 IV
HSV1+2 IGM SER IA-ACNC: 0.45 IV

## 2023-11-03 ENCOUNTER — OFFICE VISIT (OUTPATIENT)
Dept: INTERNAL MEDICINE CLINIC | Age: 33
End: 2023-11-03
Payer: COMMERCIAL

## 2023-11-03 VITALS
TEMPERATURE: 98.1 F | DIASTOLIC BLOOD PRESSURE: 64 MMHG | BODY MASS INDEX: 23.64 KG/M2 | HEIGHT: 68 IN | SYSTOLIC BLOOD PRESSURE: 100 MMHG | WEIGHT: 156 LBS

## 2023-11-03 DIAGNOSIS — H69.93 DYSFUNCTION OF BOTH EUSTACHIAN TUBES: ICD-10-CM

## 2023-11-03 DIAGNOSIS — J45.30 MILD PERSISTENT REACTIVE AIRWAY DISEASE WITHOUT COMPLICATION: ICD-10-CM

## 2023-11-03 DIAGNOSIS — R09.81 NASAL CONGESTION: ICD-10-CM

## 2023-11-03 DIAGNOSIS — J45.998 IRRITABLE AIRWAYS: ICD-10-CM

## 2023-11-03 DIAGNOSIS — R05.1 ACUTE COUGH: Primary | ICD-10-CM

## 2023-11-03 PROCEDURE — 99214 OFFICE O/P EST MOD 30 MIN: CPT | Performed by: STUDENT IN AN ORGANIZED HEALTH CARE EDUCATION/TRAINING PROGRAM

## 2023-11-03 RX ORDER — FLUTICASONE PROPIONATE 50 MCG
1 SPRAY, SUSPENSION (ML) NASAL DAILY
Qty: 16 G | Refills: 2 | Status: SHIPPED | OUTPATIENT
Start: 2023-11-03

## 2023-11-03 RX ORDER — CETIRIZINE HYDROCHLORIDE 10 MG/1
10 TABLET ORAL DAILY
Qty: 30 TABLET | Refills: 0 | Status: SHIPPED | OUTPATIENT
Start: 2023-11-03 | End: 2023-12-03

## 2023-11-03 RX ORDER — ALBUTEROL SULFATE 90 UG/1
2 AEROSOL, METERED RESPIRATORY (INHALATION) EVERY 6 HOURS PRN
Qty: 1 EACH | Refills: 5 | Status: SHIPPED | OUTPATIENT
Start: 2023-11-03

## 2023-11-03 ASSESSMENT — ENCOUNTER SYMPTOMS
EYES NEGATIVE: 1
GASTROINTESTINAL NEGATIVE: 1
ALLERGIC/IMMUNOLOGIC NEGATIVE: 1
ABDOMINAL PAIN: 0
HEMOPTYSIS: 0
COUGH: 1
SHORTNESS OF BREATH: 0
EYE DISCHARGE: 0

## 2023-11-03 NOTE — PROGRESS NOTES
Irina Fernández (:  1990) is a 28 y.o. male,Established patient, here for evaluation of the following chief complaint(s):  Cough (Nasal congestion, mucus - started Tues)    Chronic medical condition:  H/o chronic cough  # Mild persistent reactive airway disease without complication  Complete PFT done on 2020  FEV1 prebronchodilator 4.02/105 post 4.30/13.  7% change  FVC prebronchodilator 4.81/105 post 4.92/8.  2% change  FEV1/FVC prebronchodilator 84 post 87  Total lung capacity: 89%  Residual volume: 128%  Diffusion capacity: 79%     Used to see pulmonologist Dr Quinn Harris MD.     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 He does not smoke, drinks alcohol occasionally twice a year        ASSESSMENT/PLAN:  1. Acute cough-likely from allergy, trial of Zyrtec daily. -     cetirizine (ZYRTEC) 10 MG tablet; Take 1 tablet by mouth daily, Disp-30 tablet, R-0Normal  2. Mild persistent reactive airway disease without complication-use of albuterol as needed. -     albuterol sulfate HFA (PROVENTIL;VENTOLIN;PROAIR) 108 (90 Base) MCG/ACT inhaler; Inhale 2 puffs into the lungs every 6 hours as needed for Wheezing, Disp-1 each, R-5Normal  3. Irritable airways-prescribed with albuterol to be used as needed. 4. Dysfunction of both eustachian tubes-Flonase to be used  -     fluticasone (FLONASE) 50 MCG/ACT nasal spray; 1 spray by Each Nostril route daily, Disp-16 g, R-2Normal  5. Nasal congestion-worsening, use of Flonase to be taken as needed. -     fluticasone (FLONASE) 50 MCG/ACT nasal spray; 1 spray by Each Nostril route daily, Disp-16 g, R-2Normal      Return in about 6 months (around 5/3/2024). Subjective   SUBJECTIVE/OBJECTIVE:  Monday evening went for a walk. Nasal congestion since tuesday- took dayquil and mucinex. Today light cough. Yellowish nasal discharge  No headache, no chills or fever. Cough  This is a new problem. The current episode started today.  The problem has